# Patient Record
Sex: FEMALE | Race: WHITE | NOT HISPANIC OR LATINO | Employment: OTHER | URBAN - METROPOLITAN AREA
[De-identification: names, ages, dates, MRNs, and addresses within clinical notes are randomized per-mention and may not be internally consistent; named-entity substitution may affect disease eponyms.]

---

## 2017-01-01 ENCOUNTER — APPOINTMENT (OUTPATIENT)
Dept: CT IMAGING | Facility: HOSPITAL | Age: 72
End: 2017-01-01

## 2017-01-01 ENCOUNTER — ANESTHESIA (OUTPATIENT)
Dept: GASTROENTEROLOGY | Facility: HOSPITAL | Age: 72
End: 2017-01-01

## 2017-01-01 ENCOUNTER — APPOINTMENT (OUTPATIENT)
Dept: GENERAL RADIOLOGY | Facility: HOSPITAL | Age: 72
End: 2017-01-01

## 2017-01-01 ENCOUNTER — APPOINTMENT (OUTPATIENT)
Dept: CT IMAGING | Facility: HOSPITAL | Age: 72
End: 2017-01-01
Attending: INTERNAL MEDICINE

## 2017-01-01 ENCOUNTER — ANESTHESIA EVENT (OUTPATIENT)
Dept: GASTROENTEROLOGY | Facility: HOSPITAL | Age: 72
End: 2017-01-01

## 2017-01-01 ENCOUNTER — HOSPITAL ENCOUNTER (INPATIENT)
Facility: HOSPITAL | Age: 72
LOS: 5 days | Discharge: HOME HEALTH | End: 2017-04-08
Attending: EMERGENCY MEDICINE | Admitting: INTERNAL MEDICINE

## 2017-01-01 ENCOUNTER — HOSPITAL ENCOUNTER (INPATIENT)
Facility: HOSPITAL | Age: 72
LOS: 6 days | End: 2017-11-28
Attending: EMERGENCY MEDICINE | Admitting: INTERNAL MEDICINE

## 2017-01-01 VITALS
OXYGEN SATURATION: 95 % | BODY MASS INDEX: 47.8 KG/M2 | DIASTOLIC BLOOD PRESSURE: 71 MMHG | WEIGHT: 280 LBS | SYSTOLIC BLOOD PRESSURE: 152 MMHG | HEART RATE: 79 BPM | HEIGHT: 64 IN | RESPIRATION RATE: 18 BRPM | TEMPERATURE: 98.1 F

## 2017-01-01 VITALS
TEMPERATURE: 96.4 F | WEIGHT: 293 LBS | BODY MASS INDEX: 57.52 KG/M2 | DIASTOLIC BLOOD PRESSURE: 37 MMHG | HEIGHT: 60 IN | SYSTOLIC BLOOD PRESSURE: 71 MMHG

## 2017-01-01 DIAGNOSIS — R53.1 GENERALIZED WEAKNESS: ICD-10-CM

## 2017-01-01 DIAGNOSIS — E66.01 MORBID OBESITY (HCC): ICD-10-CM

## 2017-01-01 DIAGNOSIS — J98.11 ATELECTASIS: ICD-10-CM

## 2017-01-01 DIAGNOSIS — N17.9 ACUTE RENAL FAILURE, UNSPECIFIED ACUTE RENAL FAILURE TYPE (HCC): ICD-10-CM

## 2017-01-01 DIAGNOSIS — J90 PLEURAL EFFUSION, LEFT: Primary | ICD-10-CM

## 2017-01-01 DIAGNOSIS — J18.9 PNEUMONIA OF LEFT LOWER LOBE DUE TO INFECTIOUS ORGANISM: ICD-10-CM

## 2017-01-01 DIAGNOSIS — L03.311 ABDOMINAL WALL CELLULITIS: Primary | ICD-10-CM

## 2017-01-01 LAB
ALBUMIN SERPL-MCNC: 1.4 G/DL (ref 3.5–5.2)
ALBUMIN SERPL-MCNC: 1.5 G/DL (ref 3.5–5.2)
ALBUMIN SERPL-MCNC: 1.5 G/DL (ref 3.5–5.2)
ALBUMIN SERPL-MCNC: 1.8 G/DL (ref 3.5–5.2)
ALBUMIN SERPL-MCNC: 2.6 G/DL (ref 3.5–5.2)
ALBUMIN/GLOB SERPL: 0.4 G/DL
ALBUMIN/GLOB SERPL: 0.5 G/DL
ALBUMIN/GLOB SERPL: 0.8 G/DL
ALP SERPL-CCNC: 103 U/L (ref 39–117)
ALP SERPL-CCNC: 175 U/L (ref 39–117)
ALP SERPL-CCNC: 200 U/L (ref 39–117)
ALP SERPL-CCNC: 209 U/L (ref 39–117)
ALT SERPL W P-5'-P-CCNC: 12 U/L (ref 1–33)
ALT SERPL W P-5'-P-CCNC: 12 U/L (ref 1–33)
ALT SERPL W P-5'-P-CCNC: 15 U/L (ref 1–33)
ALT SERPL W P-5'-P-CCNC: 7 U/L (ref 1–33)
AMMONIA BLD-SCNC: 27 UMOL/L (ref 11–51)
ANION GAP SERPL CALCULATED.3IONS-SCNC: 10 MMOL/L
ANION GAP SERPL CALCULATED.3IONS-SCNC: 10.4 MMOL/L
ANION GAP SERPL CALCULATED.3IONS-SCNC: 13.5 MMOL/L
ANION GAP SERPL CALCULATED.3IONS-SCNC: 14.1 MMOL/L
ANION GAP SERPL CALCULATED.3IONS-SCNC: 14.7 MMOL/L
ANION GAP SERPL CALCULATED.3IONS-SCNC: 16.9 MMOL/L
ANION GAP SERPL CALCULATED.3IONS-SCNC: 18.2 MMOL/L
ANION GAP SERPL CALCULATED.3IONS-SCNC: 9.2 MMOL/L
APPEARANCE FLD: ABNORMAL
APTT PPP: 32.7 SECONDS (ref 22.7–35.4)
ARTERIAL PATENCY WRIST A: ABNORMAL
ARTERIAL PATENCY WRIST A: POSITIVE
ARTERIAL PATENCY WRIST A: POSITIVE
AST SERPL-CCNC: 26 U/L (ref 1–32)
AST SERPL-CCNC: 28 U/L (ref 1–32)
AST SERPL-CCNC: 51 U/L (ref 1–32)
AST SERPL-CCNC: 56 U/L (ref 1–32)
ATMOSPHERIC PRESS: 746.3 MMHG
ATMOSPHERIC PRESS: 752.5 MMHG
ATMOSPHERIC PRESS: 755.4 MMHG
B PERT DNA SPEC QL NAA+PROBE: NOT DETECTED
B-OH-BUTYR SERPL-SCNC: 0.92 MMOL/L (ref 0.02–0.27)
BACTERIA SPEC AEROBE CULT: NORMAL
BACTERIA SPEC AEROBE CULT: NORMAL
BASE EXCESS BLDA CALC-SCNC: -11 MMOL/L (ref 0–2)
BASE EXCESS BLDA CALC-SCNC: -12.4 MMOL/L (ref 0–2)
BASE EXCESS BLDA CALC-SCNC: -9.2 MMOL/L (ref 0–2)
BASOPHILS # BLD AUTO: 0.01 10*3/MM3 (ref 0–0.2)
BASOPHILS # BLD AUTO: 0.01 10*3/MM3 (ref 0–0.2)
BASOPHILS # BLD AUTO: 0.02 10*3/MM3 (ref 0–0.2)
BASOPHILS # BLD AUTO: 0.02 10*3/MM3 (ref 0–0.2)
BASOPHILS # BLD AUTO: 0.05 10*3/MM3 (ref 0–0.2)
BASOPHILS # BLD AUTO: 0.07 10*3/MM3 (ref 0–0.2)
BASOPHILS NFR BLD AUTO: 0.1 % (ref 0–1.5)
BASOPHILS NFR BLD AUTO: 0.3 % (ref 0–1.5)
BASOPHILS NFR BLD AUTO: 0.4 % (ref 0–1.5)
BASOPHILS NFR BLD AUTO: 0.5 % (ref 0–1.5)
BDY SITE: ABNORMAL
BILIRUB CONJ SERPL-MCNC: 0.3 MG/DL (ref 0–0.3)
BILIRUB INDIRECT SERPL-MCNC: 0.3 MG/DL
BILIRUB SERPL-MCNC: 0.2 MG/DL (ref 0.1–1.2)
BILIRUB SERPL-MCNC: 0.2 MG/DL (ref 0.1–1.2)
BILIRUB SERPL-MCNC: 0.5 MG/DL (ref 0.1–1.2)
BILIRUB SERPL-MCNC: 0.6 MG/DL (ref 0.1–1.2)
BILIRUB UR QL STRIP: NEGATIVE
BILIRUB UR QL STRIP: NEGATIVE
BUN BLD-MCNC: 19 MG/DL (ref 8–23)
BUN BLD-MCNC: 21 MG/DL (ref 8–23)
BUN BLD-MCNC: 41 MG/DL (ref 8–23)
BUN BLD-MCNC: 42 MG/DL (ref 8–23)
BUN BLD-MCNC: 48 MG/DL (ref 8–23)
BUN BLD-MCNC: 56 MG/DL (ref 8–23)
BUN BLD-MCNC: 60 MG/DL (ref 8–23)
BUN BLD-MCNC: 65 MG/DL (ref 8–23)
BUN/CREAT SERPL: 17.5 (ref 7–25)
BUN/CREAT SERPL: 18.6 (ref 7–25)
BUN/CREAT SERPL: 20.6 (ref 7–25)
BUN/CREAT SERPL: 21.1 (ref 7–25)
BUN/CREAT SERPL: 21.1 (ref 7–25)
BUN/CREAT SERPL: 23.2 (ref 7–25)
BUN/CREAT SERPL: 23.4 (ref 7–25)
BUN/CREAT SERPL: 23.7 (ref 7–25)
C PNEUM DNA NPH QL NAA+NON-PROBE: NOT DETECTED
CALCIUM SPEC-SCNC: 7.6 MG/DL (ref 8.6–10.5)
CALCIUM SPEC-SCNC: 7.7 MG/DL (ref 8.6–10.5)
CALCIUM SPEC-SCNC: 7.8 MG/DL (ref 8.6–10.5)
CALCIUM SPEC-SCNC: 8 MG/DL (ref 8.6–10.5)
CALCIUM SPEC-SCNC: 8.1 MG/DL (ref 8.6–10.5)
CALCIUM SPEC-SCNC: 8.4 MG/DL (ref 8.6–10.5)
CALCIUM SPEC-SCNC: 8.4 MG/DL (ref 8.6–10.5)
CALCIUM SPEC-SCNC: 8.5 MG/DL (ref 8.6–10.5)
CHLORIDE SERPL-SCNC: 103 MMOL/L (ref 98–107)
CHLORIDE SERPL-SCNC: 104 MMOL/L (ref 98–107)
CHLORIDE SERPL-SCNC: 105 MMOL/L (ref 98–107)
CHLORIDE SERPL-SCNC: 105 MMOL/L (ref 98–107)
CHLORIDE SERPL-SCNC: 106 MMOL/L (ref 98–107)
CHLORIDE SERPL-SCNC: 107 MMOL/L (ref 98–107)
CK SERPL-CCNC: 10 U/L (ref 20–180)
CLARITY UR: ABNORMAL
CLARITY UR: ABNORMAL
CO2 SERPL-SCNC: 11.8 MMOL/L (ref 22–29)
CO2 SERPL-SCNC: 13.1 MMOL/L (ref 22–29)
CO2 SERPL-SCNC: 16.3 MMOL/L (ref 22–29)
CO2 SERPL-SCNC: 16.5 MMOL/L (ref 22–29)
CO2 SERPL-SCNC: 19.9 MMOL/L (ref 22–29)
CO2 SERPL-SCNC: 20.8 MMOL/L (ref 22–29)
CO2 SERPL-SCNC: 26 MMOL/L (ref 22–29)
CO2 SERPL-SCNC: 29.6 MMOL/L (ref 22–29)
COLOR FLD: ABNORMAL
COLOR UR: ABNORMAL
COLOR UR: ABNORMAL
CREAT BLD-MCNC: 1.02 MG/DL (ref 0.57–1)
CREAT BLD-MCNC: 1.2 MG/DL (ref 0.57–1)
CREAT BLD-MCNC: 1.99 MG/DL (ref 0.57–1)
CREAT BLD-MCNC: 1.99 MG/DL (ref 0.57–1)
CREAT BLD-MCNC: 2.27 MG/DL (ref 0.57–1)
CREAT BLD-MCNC: 2.36 MG/DL (ref 0.57–1)
CREAT BLD-MCNC: 2.59 MG/DL (ref 0.57–1)
CREAT BLD-MCNC: 2.78 MG/DL (ref 0.57–1)
CREAT UR-MCNC: 115.3 MG/DL
CYTO UR: NORMAL
D-LACTATE SERPL-SCNC: 0.9 MMOL/L (ref 0.5–2)
D-LACTATE SERPL-SCNC: 1.9 MMOL/L (ref 0.5–2)
D-LACTATE SERPL-SCNC: 2.2 MMOL/L (ref 0.5–2)
DEPRECATED RDW RBC AUTO: 48.9 FL (ref 37–54)
DEPRECATED RDW RBC AUTO: 55.9 FL (ref 37–54)
DEPRECATED RDW RBC AUTO: 56.6 FL (ref 37–54)
DEPRECATED RDW RBC AUTO: 57.4 FL (ref 37–54)
DEPRECATED RDW RBC AUTO: 57.6 FL (ref 37–54)
DEPRECATED RDW RBC AUTO: 60.3 FL (ref 37–54)
DEPRECATED RDW RBC AUTO: 61.4 FL (ref 37–54)
EOSINOPHIL # BLD AUTO: 0.01 10*3/MM3 (ref 0–0.7)
EOSINOPHIL # BLD AUTO: 0.01 10*3/MM3 (ref 0–0.7)
EOSINOPHIL # BLD AUTO: 0.03 10*3/MM3 (ref 0–0.7)
EOSINOPHIL # BLD AUTO: 0.06 10*3/MM3 (ref 0–0.7)
EOSINOPHIL # BLD AUTO: 0.07 10*3/MM3 (ref 0–0.7)
EOSINOPHIL # BLD AUTO: 0.33 10*3/MM3 (ref 0–0.7)
EOSINOPHIL NFR BLD AUTO: 0 % (ref 0.3–6.2)
EOSINOPHIL NFR BLD AUTO: 0.1 % (ref 0.3–6.2)
EOSINOPHIL NFR BLD AUTO: 0.1 % (ref 0.3–6.2)
EOSINOPHIL NFR BLD AUTO: 0.5 % (ref 0.3–6.2)
EOSINOPHIL NFR BLD AUTO: 0.6 % (ref 0.3–6.2)
EOSINOPHIL NFR BLD AUTO: 5.9 % (ref 0.3–6.2)
EOSINOPHIL NFR FLD MANUAL: 3 %
ERYTHROCYTE [DISTWIDTH] IN BLOOD BY AUTOMATED COUNT: 13 % (ref 11.7–13)
ERYTHROCYTE [DISTWIDTH] IN BLOOD BY AUTOMATED COUNT: 15.3 % (ref 11.7–13)
ERYTHROCYTE [DISTWIDTH] IN BLOOD BY AUTOMATED COUNT: 15.5 % (ref 11.7–13)
ERYTHROCYTE [DISTWIDTH] IN BLOOD BY AUTOMATED COUNT: 15.8 % (ref 11.7–13)
ERYTHROCYTE [DISTWIDTH] IN BLOOD BY AUTOMATED COUNT: 15.9 % (ref 11.7–13)
ERYTHROCYTE [DISTWIDTH] IN BLOOD BY AUTOMATED COUNT: 16.3 % (ref 11.7–13)
ERYTHROCYTE [DISTWIDTH] IN BLOOD BY AUTOMATED COUNT: 16.6 % (ref 11.7–13)
FLUAV H1 2009 PAND RNA NPH QL NAA+PROBE: NOT DETECTED
FLUAV H1 HA GENE NPH QL NAA+PROBE: NOT DETECTED
FLUAV H3 RNA NPH QL NAA+PROBE: NOT DETECTED
FLUAV SUBTYP SPEC NAA+PROBE: NOT DETECTED
FLUBV RNA ISLT QL NAA+PROBE: NOT DETECTED
FUNGUS WND CULT: NORMAL
GAS FLOW AIRWAY: 2 LPM
GAS FLOW AIRWAY: 3 LPM
GAS FLOW AIRWAY: 3 LPM
GFR SERPL CREATININE-BSD FRML MDRD: 17 ML/MIN/1.73
GFR SERPL CREATININE-BSD FRML MDRD: 18 ML/MIN/1.73
GFR SERPL CREATININE-BSD FRML MDRD: 20 ML/MIN/1.73
GFR SERPL CREATININE-BSD FRML MDRD: 21 ML/MIN/1.73
GFR SERPL CREATININE-BSD FRML MDRD: 25 ML/MIN/1.73
GFR SERPL CREATININE-BSD FRML MDRD: 25 ML/MIN/1.73
GFR SERPL CREATININE-BSD FRML MDRD: 44 ML/MIN/1.73
GFR SERPL CREATININE-BSD FRML MDRD: 53 ML/MIN/1.73
GIE STN SPEC: NORMAL
GLOBULIN UR ELPH-MCNC: 3.2 GM/DL
GLOBULIN UR ELPH-MCNC: 3.4 GM/DL
GLOBULIN UR ELPH-MCNC: 3.9 GM/DL
GLUCOSE BLD-MCNC: 103 MG/DL (ref 65–99)
GLUCOSE BLD-MCNC: 136 MG/DL (ref 65–99)
GLUCOSE BLD-MCNC: 66 MG/DL (ref 65–99)
GLUCOSE BLD-MCNC: 69 MG/DL (ref 65–99)
GLUCOSE BLD-MCNC: 78 MG/DL (ref 65–99)
GLUCOSE BLD-MCNC: 86 MG/DL (ref 65–99)
GLUCOSE BLD-MCNC: 91 MG/DL (ref 65–99)
GLUCOSE BLD-MCNC: 94 MG/DL (ref 65–99)
GLUCOSE BLDC GLUCOMTR-MCNC: 117 MG/DL (ref 70–130)
GLUCOSE BLDC GLUCOMTR-MCNC: 95 MG/DL (ref 70–130)
GLUCOSE UR STRIP-MCNC: NEGATIVE MG/DL
GLUCOSE UR STRIP-MCNC: NEGATIVE MG/DL
GRAM STN SPEC: NORMAL
HADV DNA SPEC NAA+PROBE: NOT DETECTED
HCO3 BLDA-SCNC: 13.8 MMOL/L (ref 22–28)
HCO3 BLDA-SCNC: 15.6 MMOL/L (ref 22–28)
HCO3 BLDA-SCNC: 15.7 MMOL/L (ref 22–28)
HCOV 229E RNA SPEC QL NAA+PROBE: NOT DETECTED
HCOV HKU1 RNA SPEC QL NAA+PROBE: NOT DETECTED
HCOV NL63 RNA SPEC QL NAA+PROBE: NOT DETECTED
HCOV OC43 RNA SPEC QL NAA+PROBE: NOT DETECTED
HCT VFR BLD AUTO: 30.5 % (ref 35.6–45.5)
HCT VFR BLD AUTO: 31.1 % (ref 35.6–45.5)
HCT VFR BLD AUTO: 31.9 % (ref 35.6–45.5)
HCT VFR BLD AUTO: 32.4 % (ref 35.6–45.5)
HCT VFR BLD AUTO: 34.4 % (ref 35.6–45.5)
HCT VFR BLD AUTO: 35.5 % (ref 35.6–45.5)
HCT VFR BLD AUTO: 36.6 % (ref 35.6–45.5)
HGB BLD-MCNC: 10.1 G/DL (ref 11.9–15.5)
HGB BLD-MCNC: 10.5 G/DL (ref 11.9–15.5)
HGB BLD-MCNC: 10.8 G/DL (ref 11.9–15.5)
HGB BLD-MCNC: 11.5 G/DL (ref 11.9–15.5)
HGB BLD-MCNC: 9.6 G/DL (ref 11.9–15.5)
HGB BLD-MCNC: 9.7 G/DL (ref 11.9–15.5)
HGB BLD-MCNC: 9.7 G/DL (ref 11.9–15.5)
HGB UR QL STRIP.AUTO: NEGATIVE
HGB UR QL STRIP.AUTO: NEGATIVE
HMPV RNA NPH QL NAA+NON-PROBE: NOT DETECTED
HOLD SPECIMEN: NORMAL
HPIV1 RNA SPEC QL NAA+PROBE: NOT DETECTED
HPIV2 RNA SPEC QL NAA+PROBE: NOT DETECTED
HPIV3 RNA NPH QL NAA+PROBE: NOT DETECTED
HPIV4 P GENE NPH QL NAA+PROBE: NOT DETECTED
IMM GRANULOCYTES # BLD: 0 10*3/MM3 (ref 0–0.03)
IMM GRANULOCYTES # BLD: 0.05 10*3/MM3 (ref 0–0.03)
IMM GRANULOCYTES # BLD: 0.06 10*3/MM3 (ref 0–0.03)
IMM GRANULOCYTES # BLD: 0.11 10*3/MM3 (ref 0–0.03)
IMM GRANULOCYTES # BLD: 0.22 10*3/MM3 (ref 0–0.03)
IMM GRANULOCYTES # BLD: 0.23 10*3/MM3 (ref 0–0.03)
IMM GRANULOCYTES NFR BLD: 0 % (ref 0–0.5)
IMM GRANULOCYTES NFR BLD: 0.4 % (ref 0–0.5)
IMM GRANULOCYTES NFR BLD: 0.4 % (ref 0–0.5)
IMM GRANULOCYTES NFR BLD: 0.9 % (ref 0–0.5)
IMM GRANULOCYTES NFR BLD: 1 % (ref 0–0.5)
IMM GRANULOCYTES NFR BLD: 1 % (ref 0–0.5)
INR PPP: 1.11 (ref 0.9–1.1)
KETONES UR QL STRIP: NEGATIVE
KETONES UR QL STRIP: NEGATIVE
LAB AP CASE REPORT: NORMAL
LAB AP CLINICAL INFORMATION: NORMAL
LEUKOCYTE ESTERASE UR QL STRIP.AUTO: NEGATIVE
LEUKOCYTE ESTERASE UR QL STRIP.AUTO: NEGATIVE
LIPASE SERPL-CCNC: 20 U/L (ref 13–60)
LYMPHOCYTES # BLD AUTO: 0.32 10*3/MM3 (ref 0.9–4.8)
LYMPHOCYTES # BLD AUTO: 0.74 10*3/MM3 (ref 0.9–4.8)
LYMPHOCYTES # BLD AUTO: 1.3 10*3/MM3 (ref 0.9–4.8)
LYMPHOCYTES # BLD AUTO: 1.31 10*3/MM3 (ref 0.9–4.8)
LYMPHOCYTES # BLD AUTO: 1.54 10*3/MM3 (ref 0.9–4.8)
LYMPHOCYTES # BLD AUTO: 1.69 10*3/MM3 (ref 0.9–4.8)
LYMPHOCYTES NFR BLD AUTO: 1.4 % (ref 19.6–45.3)
LYMPHOCYTES NFR BLD AUTO: 15.9 % (ref 19.6–45.3)
LYMPHOCYTES NFR BLD AUTO: 27.3 % (ref 19.6–45.3)
LYMPHOCYTES NFR BLD AUTO: 3 % (ref 19.6–45.3)
LYMPHOCYTES NFR BLD AUTO: 8.3 % (ref 19.6–45.3)
LYMPHOCYTES NFR BLD AUTO: 9.9 % (ref 19.6–45.3)
LYMPHOCYTES NFR FLD MANUAL: 8 %
Lab: NORMAL
M PNEUMO IGG SER IA-ACNC: NOT DETECTED
MAGNESIUM SERPL-MCNC: 1.9 MG/DL (ref 1.6–2.4)
MAGNESIUM SERPL-MCNC: 2 MG/DL (ref 1.6–2.4)
MCH RBC QN AUTO: 30.8 PG (ref 26.9–32)
MCH RBC QN AUTO: 31.2 PG (ref 26.9–32)
MCH RBC QN AUTO: 31.2 PG (ref 26.9–32)
MCH RBC QN AUTO: 31.3 PG (ref 26.9–32)
MCH RBC QN AUTO: 31.6 PG (ref 26.9–32)
MCH RBC QN AUTO: 31.9 PG (ref 26.9–32)
MCH RBC QN AUTO: 32.3 PG (ref 26.9–32)
MCHC RBC AUTO-ENTMCNC: 30.1 G/DL (ref 32.4–36.3)
MCHC RBC AUTO-ENTMCNC: 30.4 G/DL (ref 32.4–36.3)
MCHC RBC AUTO-ENTMCNC: 30.5 G/DL (ref 32.4–36.3)
MCHC RBC AUTO-ENTMCNC: 31.2 G/DL (ref 32.4–36.3)
MCHC RBC AUTO-ENTMCNC: 31.2 G/DL (ref 32.4–36.3)
MCHC RBC AUTO-ENTMCNC: 31.4 G/DL (ref 32.4–36.3)
MCHC RBC AUTO-ENTMCNC: 31.8 G/DL (ref 32.4–36.3)
MCV RBC AUTO: 100 FL (ref 80.5–98.2)
MCV RBC AUTO: 100.9 FL (ref 80.5–98.2)
MCV RBC AUTO: 102.2 FL (ref 80.5–98.2)
MCV RBC AUTO: 102.8 FL (ref 80.5–98.2)
MCV RBC AUTO: 102.9 FL (ref 80.5–98.2)
MCV RBC AUTO: 103.6 FL (ref 80.5–98.2)
MCV RBC AUTO: 99.3 FL (ref 80.5–98.2)
METHOD: ABNORMAL
MODALITY: ABNORMAL
MONOCYTES # BLD AUTO: 0.16 10*3/MM3 (ref 0.2–1.2)
MONOCYTES # BLD AUTO: 0.18 10*3/MM3 (ref 0.2–1.2)
MONOCYTES # BLD AUTO: 0.39 10*3/MM3 (ref 0.2–1.2)
MONOCYTES # BLD AUTO: 0.67 10*3/MM3 (ref 0.2–1.2)
MONOCYTES # BLD AUTO: 0.84 10*3/MM3 (ref 0.2–1.2)
MONOCYTES # BLD AUTO: 0.84 10*3/MM3 (ref 0.2–1.2)
MONOCYTES NFR BLD AUTO: 0.7 % (ref 5–12)
MONOCYTES NFR BLD AUTO: 0.7 % (ref 5–12)
MONOCYTES NFR BLD AUTO: 4.3 % (ref 5–12)
MONOCYTES NFR BLD AUTO: 6.4 % (ref 5–12)
MONOCYTES NFR BLD AUTO: 6.9 % (ref 5–12)
MONOCYTES NFR BLD AUTO: 7.9 % (ref 5–12)
MONOCYTES NFR FLD: 4 %
MONOS+MACROS NFR FLD: 11 %
MYCOBACTERIUM SPEC CULT: NORMAL
NEUTROPHILS # BLD AUTO: 10.85 10*3/MM3 (ref 1.9–8.1)
NEUTROPHILS # BLD AUTO: 13.7 10*3/MM3 (ref 1.9–8.1)
NEUTROPHILS # BLD AUTO: 22.01 10*3/MM3 (ref 1.9–8.1)
NEUTROPHILS # BLD AUTO: 23.82 10*3/MM3 (ref 1.9–8.1)
NEUTROPHILS # BLD AUTO: 3.36 10*3/MM3 (ref 1.9–8.1)
NEUTROPHILS # BLD AUTO: 7.87 10*3/MM3 (ref 1.9–8.1)
NEUTROPHILS NFR BLD AUTO: 59.5 % (ref 42.7–76)
NEUTROPHILS NFR BLD AUTO: 74.1 % (ref 42.7–76)
NEUTROPHILS NFR BLD AUTO: 82.7 % (ref 42.7–76)
NEUTROPHILS NFR BLD AUTO: 86.8 % (ref 42.7–76)
NEUTROPHILS NFR BLD AUTO: 95.1 % (ref 42.7–76)
NEUTROPHILS NFR BLD AUTO: 96.7 % (ref 42.7–76)
NEUTROPHILS NFR FLD MANUAL: 74 %
NIGHT BLUE STAIN TISS: NORMAL
NITRITE UR QL STRIP: NEGATIVE
NITRITE UR QL STRIP: NEGATIVE
NRBC BLD MANUAL-RTO: 0 /100 WBC (ref 0–0)
NRBC BLD MANUAL-RTO: 0 /100 WBC (ref 0–0)
NRBC BLD MANUAL-RTO: 0.1 /100 WBC (ref 0–0)
NT-PROBNP SERPL-MCNC: 913.1 PG/ML (ref 5–900)
NUC CELL # FLD: 780 /MM3
PATH REPORT.FINAL DX SPEC: NORMAL
PATH REPORT.GROSS SPEC: NORMAL
PCO2 BLDA: 29.6 MM HG (ref 35–45)
PCO2 BLDA: 31.7 MM HG (ref 35–45)
PCO2 BLDA: 36.7 MM HG (ref 35–45)
PH BLDA: 7.24 PH UNITS (ref 7.35–7.45)
PH BLDA: 7.25 PH UNITS (ref 7.35–7.45)
PH BLDA: 7.33 PH UNITS (ref 7.35–7.45)
PH UR STRIP.AUTO: 5.5 [PH] (ref 5–8)
PH UR STRIP.AUTO: 5.5 [PH] (ref 5–8)
PHOSPHATE SERPL-MCNC: 4.2 MG/DL (ref 2.5–4.5)
PHOSPHATE SERPL-MCNC: 4.6 MG/DL (ref 2.5–4.5)
PHOSPHATE SERPL-MCNC: 4.8 MG/DL (ref 2.5–4.5)
PHOSPHATE SERPL-MCNC: 5.7 MG/DL (ref 2.5–4.5)
PLAT MORPH BLD: NORMAL
PLATELET # BLD AUTO: 162 10*3/MM3 (ref 140–500)
PLATELET # BLD AUTO: 215 10*3/MM3 (ref 140–500)
PLATELET # BLD AUTO: 222 10*3/MM3 (ref 140–500)
PLATELET # BLD AUTO: 230 10*3/MM3 (ref 140–500)
PLATELET # BLD AUTO: 235 10*3/MM3 (ref 140–500)
PLATELET # BLD AUTO: 245 10*3/MM3 (ref 140–500)
PLATELET # BLD AUTO: 300 10*3/MM3 (ref 140–500)
PMV BLD AUTO: 10.1 FL (ref 6–12)
PMV BLD AUTO: 10.3 FL (ref 6–12)
PMV BLD AUTO: 10.3 FL (ref 6–12)
PMV BLD AUTO: 9.5 FL (ref 6–12)
PMV BLD AUTO: 9.5 FL (ref 6–12)
PMV BLD AUTO: 9.7 FL (ref 6–12)
PMV BLD AUTO: 9.9 FL (ref 6–12)
PO2 BLDA: 108.6 MM HG (ref 80–100)
PO2 BLDA: 68.1 MM HG (ref 80–100)
PO2 BLDA: 69.5 MM HG (ref 80–100)
POTASSIUM BLD-SCNC: 3.7 MMOL/L (ref 3.5–5.2)
POTASSIUM BLD-SCNC: 3.9 MMOL/L (ref 3.5–5.2)
POTASSIUM BLD-SCNC: 3.9 MMOL/L (ref 3.5–5.2)
POTASSIUM BLD-SCNC: 4.2 MMOL/L (ref 3.5–5.2)
POTASSIUM BLD-SCNC: 4.3 MMOL/L (ref 3.5–5.2)
POTASSIUM BLD-SCNC: 4.3 MMOL/L (ref 3.5–5.2)
POTASSIUM BLD-SCNC: 4.5 MMOL/L (ref 3.5–5.2)
POTASSIUM BLD-SCNC: 4.8 MMOL/L (ref 3.5–5.2)
PROCALCITONIN SERPL-MCNC: 0.58 NG/ML (ref 0.1–0.25)
PROCALCITONIN SERPL-MCNC: 11.16 NG/ML (ref 0.1–0.25)
PROCALCITONIN SERPL-MCNC: 13.26 NG/ML (ref 0.1–0.25)
PROCALCITONIN SERPL-MCNC: 16.65 NG/ML (ref 0.1–0.25)
PROCALCITONIN SERPL-MCNC: 16.88 NG/ML (ref 0.1–0.25)
PROT SERPL-MCNC: 4.9 G/DL (ref 6–8.5)
PROT SERPL-MCNC: 5.1 G/DL (ref 6–8.5)
PROT SERPL-MCNC: 5.7 G/DL (ref 6–8.5)
PROT SERPL-MCNC: 5.8 G/DL (ref 6–8.5)
PROT UR QL STRIP: ABNORMAL
PROT UR QL STRIP: ABNORMAL
PROTHROMBIN TIME: 13.9 SECONDS (ref 11.7–14.2)
RBC # BLD AUTO: 3.07 10*6/MM3 (ref 3.9–5.2)
RBC # BLD AUTO: 3.08 10*6/MM3 (ref 3.9–5.2)
RBC # BLD AUTO: 3.11 10*6/MM3 (ref 3.9–5.2)
RBC # BLD AUTO: 3.17 10*6/MM3 (ref 3.9–5.2)
RBC # BLD AUTO: 3.41 10*6/MM3 (ref 3.9–5.2)
RBC # BLD AUTO: 3.45 10*6/MM3 (ref 3.9–5.2)
RBC # BLD AUTO: 3.56 10*6/MM3 (ref 3.9–5.2)
RBC # FLD AUTO: ABNORMAL /MM3
RBC MORPH BLD: NORMAL
RHINOVIRUS RNA SPEC NAA+PROBE: NOT DETECTED
RSV RNA NPH QL NAA+NON-PROBE: NOT DETECTED
SAO2 % BLDCOA: 89.9 % (ref 92–99)
SAO2 % BLDCOA: 90.8 % (ref 92–99)
SAO2 % BLDCOA: 98 % (ref 92–99)
SET MECH RESP RATE: 16
SODIUM BLD-SCNC: 135 MMOL/L (ref 136–145)
SODIUM BLD-SCNC: 136 MMOL/L (ref 136–145)
SODIUM BLD-SCNC: 137 MMOL/L (ref 136–145)
SODIUM BLD-SCNC: 137 MMOL/L (ref 136–145)
SODIUM BLD-SCNC: 142 MMOL/L (ref 136–145)
SODIUM BLD-SCNC: 146 MMOL/L (ref 136–145)
SODIUM UR-SCNC: <20 MMOL/L
SP GR UR STRIP: 1.02 (ref 1–1.03)
SP GR UR STRIP: 1.02 (ref 1–1.03)
TOTAL RATE: 24 BREATHS/MINUTE
TOTAL RATE: 26 BREATHS/MINUTE
TROPONIN T SERPL-MCNC: <0.01 NG/ML (ref 0–0.03)
URATE SERPL-MCNC: 12.3 MG/DL (ref 2.4–5.7)
UROBILINOGEN UR QL STRIP: ABNORMAL
UROBILINOGEN UR QL STRIP: ABNORMAL
VANCOMYCIN SERPL-MCNC: 26.4 MCG/ML (ref 5–40)
VANCOMYCIN SERPL-MCNC: 30.6 MCG/ML (ref 5–40)
VANCOMYCIN SERPL-MCNC: 37.7 MCG/ML (ref 5–40)
WBC MORPH BLD: NORMAL
WBC NRBC COR # BLD: 10.62 10*3/MM3 (ref 4.5–10.7)
WBC NRBC COR # BLD: 12.18 10*3/MM3 (ref 4.5–10.7)
WBC NRBC COR # BLD: 13.12 10*3/MM3 (ref 4.5–10.7)
WBC NRBC COR # BLD: 15.76 10*3/MM3 (ref 4.5–10.7)
WBC NRBC COR # BLD: 22.77 10*3/MM3 (ref 4.5–10.7)
WBC NRBC COR # BLD: 25.04 10*3/MM3 (ref 4.5–10.7)
WBC NRBC COR # BLD: 5.64 10*3/MM3 (ref 4.5–10.7)
WHOLE BLOOD HOLD SPECIMEN: NORMAL

## 2017-01-01 PROCEDURE — 99284 EMERGENCY DEPT VISIT MOD MDM: CPT

## 2017-01-01 PROCEDURE — 84484 ASSAY OF TROPONIN QUANT: CPT | Performed by: HOSPITALIST

## 2017-01-01 PROCEDURE — 74176 CT ABD & PELVIS W/O CONTRAST: CPT

## 2017-01-01 PROCEDURE — 25010000002 HYDROMORPHONE PER 4 MG

## 2017-01-01 PROCEDURE — 81003 URINALYSIS AUTO W/O SCOPE: CPT | Performed by: HOSPITALIST

## 2017-01-01 PROCEDURE — 85025 COMPLETE CBC W/AUTO DIFF WBC: CPT | Performed by: EMERGENCY MEDICINE

## 2017-01-01 PROCEDURE — 83735 ASSAY OF MAGNESIUM: CPT | Performed by: HOSPITALIST

## 2017-01-01 PROCEDURE — 25010000002 HEPARIN (PORCINE) PER 1000 UNITS: Performed by: INTERNAL MEDICINE

## 2017-01-01 PROCEDURE — 94799 UNLISTED PULMONARY SVC/PX: CPT

## 2017-01-01 PROCEDURE — 85025 COMPLETE CBC W/AUTO DIFF WBC: CPT | Performed by: HOSPITALIST

## 2017-01-01 PROCEDURE — 25010000002 PIPERACILLIN SOD-TAZOBACTAM PER 1 G: Performed by: INTERNAL MEDICINE

## 2017-01-01 PROCEDURE — 82962 GLUCOSE BLOOD TEST: CPT

## 2017-01-01 PROCEDURE — 25010000002 FLUCONAZOLE PER 200 MG: Performed by: INTERNAL MEDICINE

## 2017-01-01 PROCEDURE — 80202 ASSAY OF VANCOMYCIN: CPT | Performed by: HOSPITALIST

## 2017-01-01 PROCEDURE — 99285 EMERGENCY DEPT VISIT HI MDM: CPT

## 2017-01-01 PROCEDURE — 83735 ASSAY OF MAGNESIUM: CPT | Performed by: EMERGENCY MEDICINE

## 2017-01-01 PROCEDURE — 93005 ELECTROCARDIOGRAM TRACING: CPT | Performed by: INTERNAL MEDICINE

## 2017-01-01 PROCEDURE — 87206 SMEAR FLUORESCENT/ACID STAI: CPT | Performed by: INTERNAL MEDICINE

## 2017-01-01 PROCEDURE — 80048 BASIC METABOLIC PNL TOTAL CA: CPT | Performed by: HOSPITALIST

## 2017-01-01 PROCEDURE — 84145 PROCALCITONIN (PCT): CPT | Performed by: INTERNAL MEDICINE

## 2017-01-01 PROCEDURE — 25010000002 VANCOMYCIN 10 G RECONSTITUTED SOLUTION: Performed by: HOSPITALIST

## 2017-01-01 PROCEDURE — 88312 SPECIAL STAINS GROUP 1: CPT | Performed by: INTERNAL MEDICINE

## 2017-01-01 PROCEDURE — 36600 WITHDRAWAL OF ARTERIAL BLOOD: CPT

## 2017-01-01 PROCEDURE — 94640 AIRWAY INHALATION TREATMENT: CPT

## 2017-01-01 PROCEDURE — 85027 COMPLETE CBC AUTOMATED: CPT | Performed by: HOSPITALIST

## 2017-01-01 PROCEDURE — 87633 RESP VIRUS 12-25 TARGETS: CPT | Performed by: HOSPITALIST

## 2017-01-01 PROCEDURE — 25010000002 PROPOFOL 10 MG/ML EMULSION: Performed by: ANESTHESIOLOGY

## 2017-01-01 PROCEDURE — 82803 BLOOD GASES ANY COMBINATION: CPT

## 2017-01-01 PROCEDURE — 80053 COMPREHEN METABOLIC PANEL: CPT | Performed by: EMERGENCY MEDICINE

## 2017-01-01 PROCEDURE — 93010 ELECTROCARDIOGRAM REPORT: CPT | Performed by: INTERNAL MEDICINE

## 2017-01-01 PROCEDURE — 25010000002 HEPARIN (PORCINE) PER 1000 UNITS: Performed by: HOSPITALIST

## 2017-01-01 PROCEDURE — 83735 ASSAY OF MAGNESIUM: CPT | Performed by: INTERNAL MEDICINE

## 2017-01-01 PROCEDURE — 93005 ELECTROCARDIOGRAM TRACING: CPT | Performed by: HOSPITALIST

## 2017-01-01 PROCEDURE — 99222 1ST HOSP IP/OBS MODERATE 55: CPT | Performed by: INTERNAL MEDICINE

## 2017-01-01 PROCEDURE — 83605 ASSAY OF LACTIC ACID: CPT | Performed by: HOSPITALIST

## 2017-01-01 PROCEDURE — 81003 URINALYSIS AUTO W/O SCOPE: CPT | Performed by: EMERGENCY MEDICINE

## 2017-01-01 PROCEDURE — 87040 BLOOD CULTURE FOR BACTERIA: CPT | Performed by: EMERGENCY MEDICINE

## 2017-01-01 PROCEDURE — 82010 KETONE BODYS QUAN: CPT | Performed by: HOSPITALIST

## 2017-01-01 PROCEDURE — 85007 BL SMEAR W/DIFF WBC COUNT: CPT | Performed by: EMERGENCY MEDICINE

## 2017-01-01 PROCEDURE — 97110 THERAPEUTIC EXERCISES: CPT

## 2017-01-01 PROCEDURE — 83690 ASSAY OF LIPASE: CPT | Performed by: EMERGENCY MEDICINE

## 2017-01-01 PROCEDURE — 63710000001 PROCHLORPERAZINE MALEATE PER 5 MG: Performed by: INTERNAL MEDICINE

## 2017-01-01 PROCEDURE — 25010000002 EPINEPHRINE PER 0.1 MG: Performed by: INTERNAL MEDICINE

## 2017-01-01 PROCEDURE — 25010000003 CEFTRIAXONE PER 250 MG: Performed by: INTERNAL MEDICINE

## 2017-01-01 PROCEDURE — 25010000002 VANCOMYCIN: Performed by: EMERGENCY MEDICINE

## 2017-01-01 PROCEDURE — 85610 PROTHROMBIN TIME: CPT | Performed by: INTERNAL MEDICINE

## 2017-01-01 PROCEDURE — 93005 ELECTROCARDIOGRAM TRACING: CPT | Performed by: EMERGENCY MEDICINE

## 2017-01-01 PROCEDURE — 25010000002 HYDROMORPHONE PER 4 MG: Performed by: INTERNAL MEDICINE

## 2017-01-01 PROCEDURE — 82570 ASSAY OF URINE CREATININE: CPT | Performed by: INTERNAL MEDICINE

## 2017-01-01 PROCEDURE — 0B9B8ZX DRAINAGE OF LEFT LOWER LOBE BRONCHUS, VIA NATURAL OR ARTIFICIAL OPENING ENDOSCOPIC, DIAGNOSTIC: ICD-10-PCS | Performed by: INTERNAL MEDICINE

## 2017-01-01 PROCEDURE — 84484 ASSAY OF TROPONIN QUANT: CPT | Performed by: EMERGENCY MEDICINE

## 2017-01-01 PROCEDURE — 83880 ASSAY OF NATRIURETIC PEPTIDE: CPT | Performed by: EMERGENCY MEDICINE

## 2017-01-01 PROCEDURE — 71250 CT THORAX DX C-: CPT

## 2017-01-01 PROCEDURE — 87102 FUNGUS ISOLATION CULTURE: CPT | Performed by: INTERNAL MEDICINE

## 2017-01-01 PROCEDURE — 25010000002 LEVOFLOXACIN PER 250 MG: Performed by: HOSPITALIST

## 2017-01-01 PROCEDURE — 80069 RENAL FUNCTION PANEL: CPT | Performed by: INTERNAL MEDICINE

## 2017-01-01 PROCEDURE — 80053 COMPREHEN METABOLIC PANEL: CPT | Performed by: HOSPITALIST

## 2017-01-01 PROCEDURE — 71020 HC CHEST PA AND LATERAL: CPT

## 2017-01-01 PROCEDURE — 85730 THROMBOPLASTIN TIME PARTIAL: CPT | Performed by: INTERNAL MEDICINE

## 2017-01-01 PROCEDURE — 84550 ASSAY OF BLOOD/URIC ACID: CPT | Performed by: INTERNAL MEDICINE

## 2017-01-01 PROCEDURE — 82140 ASSAY OF AMMONIA: CPT | Performed by: HOSPITALIST

## 2017-01-01 PROCEDURE — 87581 M.PNEUMON DNA AMP PROBE: CPT | Performed by: HOSPITALIST

## 2017-01-01 PROCEDURE — 84145 PROCALCITONIN (PCT): CPT | Performed by: EMERGENCY MEDICINE

## 2017-01-01 PROCEDURE — 25010000002 ENOXAPARIN PER 10 MG: Performed by: HOSPITALIST

## 2017-01-01 PROCEDURE — 87798 DETECT AGENT NOS DNA AMP: CPT | Performed by: HOSPITALIST

## 2017-01-01 PROCEDURE — 87486 CHLMYD PNEUM DNA AMP PROBE: CPT | Performed by: HOSPITALIST

## 2017-01-01 PROCEDURE — 80076 HEPATIC FUNCTION PANEL: CPT | Performed by: HOSPITALIST

## 2017-01-01 PROCEDURE — 71010 HC CHEST PA OR AP: CPT

## 2017-01-01 PROCEDURE — 84100 ASSAY OF PHOSPHORUS: CPT | Performed by: HOSPITALIST

## 2017-01-01 PROCEDURE — 87116 MYCOBACTERIA CULTURE: CPT | Performed by: INTERNAL MEDICINE

## 2017-01-01 PROCEDURE — 36415 COLL VENOUS BLD VENIPUNCTURE: CPT | Performed by: EMERGENCY MEDICINE

## 2017-01-01 PROCEDURE — 88305 TISSUE EXAM BY PATHOLOGIST: CPT | Performed by: INTERNAL MEDICINE

## 2017-01-01 PROCEDURE — 25010000002 LEVETIRACETAM IN NACL 0.82% 500 MG/100ML SOLUTION: Performed by: HOSPITALIST

## 2017-01-01 PROCEDURE — 89051 BODY FLUID CELL COUNT: CPT | Performed by: INTERNAL MEDICINE

## 2017-01-01 PROCEDURE — 88112 CYTOPATH CELL ENHANCE TECH: CPT | Performed by: INTERNAL MEDICINE

## 2017-01-01 PROCEDURE — 84100 ASSAY OF PHOSPHORUS: CPT | Performed by: INTERNAL MEDICINE

## 2017-01-01 PROCEDURE — 87071 CULTURE AEROBIC QUANT OTHER: CPT | Performed by: INTERNAL MEDICINE

## 2017-01-01 PROCEDURE — 82550 ASSAY OF CK (CPK): CPT | Performed by: HOSPITALIST

## 2017-01-01 PROCEDURE — 83605 ASSAY OF LACTIC ACID: CPT | Performed by: EMERGENCY MEDICINE

## 2017-01-01 PROCEDURE — 87205 SMEAR GRAM STAIN: CPT | Performed by: INTERNAL MEDICINE

## 2017-01-01 PROCEDURE — 25010000002 PIPERACILLIN SOD-TAZOBACTAM PER 1 G: Performed by: EMERGENCY MEDICINE

## 2017-01-01 PROCEDURE — 97162 PT EVAL MOD COMPLEX 30 MIN: CPT

## 2017-01-01 PROCEDURE — 80048 BASIC METABOLIC PNL TOTAL CA: CPT | Performed by: INTERNAL MEDICINE

## 2017-01-01 PROCEDURE — 25010000002 MORPHINE PER 10 MG: Performed by: HOSPITALIST

## 2017-01-01 PROCEDURE — 84300 ASSAY OF URINE SODIUM: CPT | Performed by: INTERNAL MEDICINE

## 2017-01-01 RX ORDER — DIPHENOXYLATE HYDROCHLORIDE AND ATROPINE SULFATE 2.5; .025 MG/1; MG/1
1 TABLET ORAL
Status: DISCONTINUED | OUTPATIENT
Start: 2017-01-01 | End: 2017-11-29 | Stop reason: HOSPADM

## 2017-01-01 RX ORDER — IPRATROPIUM BROMIDE AND ALBUTEROL SULFATE 2.5; .5 MG/3ML; MG/3ML
3 SOLUTION RESPIRATORY (INHALATION)
Status: DISCONTINUED | OUTPATIENT
Start: 2017-01-01 | End: 2017-01-01

## 2017-01-01 RX ORDER — HYDROMORPHONE HCL 110MG/55ML
2 PATIENT CONTROLLED ANALGESIA SYRINGE INTRAVENOUS
Status: DISCONTINUED | OUTPATIENT
Start: 2017-01-01 | End: 2017-11-29 | Stop reason: HOSPADM

## 2017-01-01 RX ORDER — PROCHLORPERAZINE MALEATE 10 MG
10 TABLET ORAL EVERY 6 HOURS PRN
COMMUNITY

## 2017-01-01 RX ORDER — AMLODIPINE BESYLATE 5 MG/1
5 TABLET ORAL DAILY
Status: DISCONTINUED | OUTPATIENT
Start: 2017-01-01 | End: 2017-01-01

## 2017-01-01 RX ORDER — NYSTATIN 100000 [USP'U]/G
POWDER TOPICAL EVERY 12 HOURS SCHEDULED
Status: DISCONTINUED | OUTPATIENT
Start: 2017-01-01 | End: 2017-01-01

## 2017-01-01 RX ORDER — HEPARIN SODIUM 5000 [USP'U]/ML
5000 INJECTION, SOLUTION INTRAVENOUS; SUBCUTANEOUS EVERY 8 HOURS SCHEDULED
Status: DISCONTINUED | OUTPATIENT
Start: 2017-01-01 | End: 2017-01-01 | Stop reason: HOSPADM

## 2017-01-01 RX ORDER — HYDROCODONE BITARTRATE AND ACETAMINOPHEN 5; 325 MG/1; MG/1
2 TABLET ORAL 3 TIMES DAILY PRN
Status: DISCONTINUED | OUTPATIENT
Start: 2017-01-01 | End: 2017-01-01

## 2017-01-01 RX ORDER — LORAZEPAM 1 MG/1
2 TABLET ORAL
Status: DISCONTINUED | OUTPATIENT
Start: 2017-01-01 | End: 2017-11-29 | Stop reason: HOSPADM

## 2017-01-01 RX ORDER — FLUTICASONE PROPIONATE 50 MCG
1 SPRAY, SUSPENSION (ML) NASAL DAILY
Status: DISCONTINUED | OUTPATIENT
Start: 2017-01-01 | End: 2017-01-01 | Stop reason: HOSPADM

## 2017-01-01 RX ORDER — LORAZEPAM 2 MG/ML
2 CONCENTRATE ORAL
Status: DISCONTINUED | OUTPATIENT
Start: 2017-01-01 | End: 2017-11-29 | Stop reason: HOSPADM

## 2017-01-01 RX ORDER — SACCHAROMYCES BOULARDII 250 MG
250 CAPSULE ORAL 2 TIMES DAILY
COMMUNITY
End: 2017-01-01 | Stop reason: CLARIF

## 2017-01-01 RX ORDER — SODIUM CHLORIDE 0.9 % (FLUSH) 0.9 %
1-10 SYRINGE (ML) INJECTION AS NEEDED
Status: DISCONTINUED | OUTPATIENT
Start: 2017-01-01 | End: 2017-01-01 | Stop reason: HOSPADM

## 2017-01-01 RX ORDER — METRONIDAZOLE 500 MG/1
500 TABLET ORAL 3 TIMES DAILY
COMMUNITY

## 2017-01-01 RX ORDER — LEVOTHYROXINE SODIUM 0.07 MG/1
75 TABLET ORAL
Status: DISCONTINUED | OUTPATIENT
Start: 2017-01-01 | End: 2017-01-01

## 2017-01-01 RX ORDER — POTASSIUM CHLORIDE 750 MG/1
20 CAPSULE, EXTENDED RELEASE ORAL DAILY
Status: DISCONTINUED | OUTPATIENT
Start: 2017-01-01 | End: 2017-01-01 | Stop reason: HOSPADM

## 2017-01-01 RX ORDER — LORAZEPAM 1 MG/1
1 TABLET ORAL
Status: DISCONTINUED | OUTPATIENT
Start: 2017-01-01 | End: 2017-11-29 | Stop reason: HOSPADM

## 2017-01-01 RX ORDER — LEVETIRACETAM 500 MG/1
TABLET ORAL
Qty: 28 TABLET | Refills: 0 | Status: SHIPPED | OUTPATIENT
Start: 2017-01-01 | End: 2017-01-01 | Stop reason: SDUPTHER

## 2017-01-01 RX ORDER — LEVOFLOXACIN 5 MG/ML
750 INJECTION, SOLUTION INTRAVENOUS
Status: COMPLETED | OUTPATIENT
Start: 2017-01-01 | End: 2017-01-01

## 2017-01-01 RX ORDER — LORATADINE 10 MG/1
TABLET ORAL
Qty: 30 TABLET | Refills: 0 | Status: SHIPPED | OUTPATIENT
Start: 2017-01-01 | End: 2017-01-01 | Stop reason: SDUPTHER

## 2017-01-01 RX ORDER — LORAZEPAM 2 MG/ML
1 INJECTION INTRAMUSCULAR
Status: DISCONTINUED | OUTPATIENT
Start: 2017-01-01 | End: 2017-11-29 | Stop reason: HOSPADM

## 2017-01-01 RX ORDER — ACETAMINOPHEN 160 MG/5ML
650 SOLUTION ORAL EVERY 4 HOURS PRN
Status: DISCONTINUED | OUTPATIENT
Start: 2017-01-01 | End: 2017-11-29 | Stop reason: HOSPADM

## 2017-01-01 RX ORDER — CYCLOBENZAPRINE HCL 10 MG
10 TABLET ORAL EVERY 12 HOURS PRN
Status: DISCONTINUED | OUTPATIENT
Start: 2017-01-01 | End: 2017-01-01 | Stop reason: HOSPADM

## 2017-01-01 RX ORDER — IPRATROPIUM BROMIDE AND ALBUTEROL SULFATE 2.5; .5 MG/3ML; MG/3ML
3 SOLUTION RESPIRATORY (INHALATION)
Status: DISCONTINUED | OUTPATIENT
Start: 2017-01-01 | End: 2017-01-01 | Stop reason: HOSPADM

## 2017-01-01 RX ORDER — HALOPERIDOL 1 MG/1
1 TABLET ORAL EVERY 4 HOURS PRN
Status: DISCONTINUED | OUTPATIENT
Start: 2017-01-01 | End: 2017-11-29 | Stop reason: HOSPADM

## 2017-01-01 RX ORDER — FUROSEMIDE 40 MG/1
40 TABLET ORAL DAILY
Status: DISCONTINUED | OUTPATIENT
Start: 2017-01-01 | End: 2017-01-01 | Stop reason: HOSPADM

## 2017-01-01 RX ORDER — MONTELUKAST SODIUM 10 MG/1
TABLET ORAL
Qty: 30 TABLET | Refills: 2 | OUTPATIENT
Start: 2017-01-01

## 2017-01-01 RX ORDER — FLUCONAZOLE 2 MG/ML
100 INJECTION, SOLUTION INTRAVENOUS DAILY
Status: DISCONTINUED | OUTPATIENT
Start: 2017-01-01 | End: 2017-01-01

## 2017-01-01 RX ORDER — PANTOPRAZOLE SODIUM 20 MG/1
20 TABLET, DELAYED RELEASE ORAL
Status: DISCONTINUED | OUTPATIENT
Start: 2017-01-01 | End: 2017-01-01 | Stop reason: HOSPADM

## 2017-01-01 RX ORDER — ASPIRIN 81 MG/1
81 TABLET, CHEWABLE ORAL DAILY
Status: DISCONTINUED | OUTPATIENT
Start: 2017-01-01 | End: 2017-01-01

## 2017-01-01 RX ORDER — SODIUM CHLORIDE 0.9 % (FLUSH) 0.9 %
10 SYRINGE (ML) INJECTION AS NEEDED
Status: DISCONTINUED | OUTPATIENT
Start: 2017-01-01 | End: 2017-01-01

## 2017-01-01 RX ORDER — LORATADINE 10 MG/1
10 TABLET ORAL DAILY
COMMUNITY

## 2017-01-01 RX ORDER — DORZOLAMIDE HYDROCHLORIDE AND TIMOLOL MALEATE 20; 5 MG/ML; MG/ML
1 SOLUTION/ DROPS OPHTHALMIC 2 TIMES DAILY
Status: DISCONTINUED | OUTPATIENT
Start: 2017-01-01 | End: 2017-01-01 | Stop reason: HOSPADM

## 2017-01-01 RX ORDER — SODIUM CHLORIDE 9 MG/ML
125 INJECTION, SOLUTION INTRAVENOUS CONTINUOUS
Status: DISCONTINUED | OUTPATIENT
Start: 2017-01-01 | End: 2017-01-01

## 2017-01-01 RX ORDER — SODIUM CHLORIDE FOR INHALATION 7 %
4 VIAL, NEBULIZER (ML) INHALATION
Status: DISCONTINUED | OUTPATIENT
Start: 2017-01-01 | End: 2017-01-01 | Stop reason: HOSPADM

## 2017-01-01 RX ORDER — MONTELUKAST SODIUM 10 MG/1
10 TABLET ORAL DAILY
COMMUNITY

## 2017-01-01 RX ORDER — HYDROMORPHONE HCL 110MG/55ML
PATIENT CONTROLLED ANALGESIA SYRINGE INTRAVENOUS
Status: COMPLETED
Start: 2017-01-01 | End: 2017-01-01

## 2017-01-01 RX ORDER — BUDESONIDE 0.5 MG/2ML
0.5 INHALANT ORAL
Status: DISCONTINUED | OUTPATIENT
Start: 2017-01-01 | End: 2017-01-01 | Stop reason: HOSPADM

## 2017-01-01 RX ORDER — LIDOCAINE HYDROCHLORIDE 20 MG/ML
INJECTION, SOLUTION EPIDURAL; INFILTRATION; INTRACAUDAL; PERINEURAL AS NEEDED
Status: DISCONTINUED | OUTPATIENT
Start: 2017-01-01 | End: 2017-01-01 | Stop reason: HOSPADM

## 2017-01-01 RX ORDER — ATORVASTATIN CALCIUM 10 MG/1
10 TABLET, FILM COATED ORAL NIGHTLY
Status: DISCONTINUED | OUTPATIENT
Start: 2017-01-01 | End: 2017-01-01 | Stop reason: HOSPADM

## 2017-01-01 RX ORDER — SODIUM CHLORIDE 450 MG/100ML
100 INJECTION, SOLUTION INTRAVENOUS CONTINUOUS
Status: DISCONTINUED | OUTPATIENT
Start: 2017-01-01 | End: 2017-01-01

## 2017-01-01 RX ORDER — FLUTICASONE PROPIONATE 50 MCG
SPRAY, SUSPENSION (ML) NASAL
Refills: 2 | OUTPATIENT
Start: 2017-01-01

## 2017-01-01 RX ORDER — LEVOTHYROXINE SODIUM 0.07 MG/1
75 TABLET ORAL
Status: DISCONTINUED | OUTPATIENT
Start: 2017-01-01 | End: 2017-01-01 | Stop reason: HOSPADM

## 2017-01-01 RX ORDER — LETROZOLE 2.5 MG/1
2.5 TABLET, FILM COATED ORAL DAILY
COMMUNITY

## 2017-01-01 RX ORDER — ERGOCALCIFEROL 1.25 MG/1
CAPSULE ORAL
Qty: 12 CAPSULE | Refills: 2 | OUTPATIENT
Start: 2017-01-01

## 2017-01-01 RX ORDER — LEVOFLOXACIN 5 MG/ML
500 INJECTION, SOLUTION INTRAVENOUS
Status: DISCONTINUED | OUTPATIENT
Start: 2017-01-01 | End: 2017-01-01 | Stop reason: DRUGHIGH

## 2017-01-01 RX ORDER — SODIUM CHLORIDE FOR INHALATION 7 %
4 VIAL, NEBULIZER (ML) INHALATION
Qty: 240 ML | Refills: 0 | Status: SHIPPED | OUTPATIENT
Start: 2017-01-01 | End: 2017-01-01

## 2017-01-01 RX ORDER — DORZOLAMIDE HYDROCHLORIDE AND TIMOLOL MALEATE 20; 5 MG/ML; MG/ML
1 SOLUTION/ DROPS OPHTHALMIC 2 TIMES DAILY
Status: DISCONTINUED | OUTPATIENT
Start: 2017-01-01 | End: 2017-01-01

## 2017-01-01 RX ORDER — EMOLLIENT COMBINATION NO.110
LOTION (ML) TOPICAL DAILY
Status: DISCONTINUED | OUTPATIENT
Start: 2017-01-01 | End: 2017-01-01 | Stop reason: HOSPADM

## 2017-01-01 RX ORDER — ACETAMINOPHEN 325 MG/1
650 TABLET ORAL EVERY 4 HOURS PRN
Status: DISCONTINUED | OUTPATIENT
Start: 2017-01-01 | End: 2017-11-29 | Stop reason: HOSPADM

## 2017-01-01 RX ORDER — FLUTICASONE PROPIONATE 50 MCG
2 SPRAY, SUSPENSION (ML) NASAL DAILY
Status: DISCONTINUED | OUTPATIENT
Start: 2017-01-01 | End: 2017-01-01

## 2017-01-01 RX ORDER — LISINOPRIL 5 MG/1
5 TABLET ORAL DAILY
COMMUNITY

## 2017-01-01 RX ORDER — HYDROCODONE BITARTRATE AND ACETAMINOPHEN 5; 325 MG/1; MG/1
1 TABLET ORAL EVERY 6 HOURS PRN
Status: DISCONTINUED | OUTPATIENT
Start: 2017-01-01 | End: 2017-01-01 | Stop reason: HOSPADM

## 2017-01-01 RX ORDER — BUMETANIDE 0.25 MG/ML
2 INJECTION INTRAMUSCULAR; INTRAVENOUS ONCE
Status: COMPLETED | OUTPATIENT
Start: 2017-01-01 | End: 2017-01-01

## 2017-01-01 RX ORDER — LEVETIRACETAM 5 MG/ML
500 INJECTION INTRAVASCULAR EVERY 12 HOURS SCHEDULED
Status: DISCONTINUED | OUTPATIENT
Start: 2017-01-01 | End: 2017-01-01

## 2017-01-01 RX ORDER — PANTOPRAZOLE SODIUM 40 MG/1
40 TABLET, DELAYED RELEASE ORAL
Status: DISCONTINUED | OUTPATIENT
Start: 2017-01-01 | End: 2017-01-01

## 2017-01-01 RX ORDER — POTASSIUM CHLORIDE 1500 MG/1
TABLET, EXTENDED RELEASE ORAL
Qty: 60 TABLET | Refills: 1 | Status: SHIPPED | OUTPATIENT
Start: 2017-01-01 | End: 2017-01-01 | Stop reason: SDUPTHER

## 2017-01-01 RX ORDER — LIDOCAINE HYDROCHLORIDE 20 MG/ML
INJECTION, SOLUTION INFILTRATION; PERINEURAL AS NEEDED
Status: DISCONTINUED | OUTPATIENT
Start: 2017-01-01 | End: 2017-01-01 | Stop reason: SURG

## 2017-01-01 RX ORDER — LORAZEPAM 2 MG/ML
1 CONCENTRATE ORAL
Status: DISCONTINUED | OUTPATIENT
Start: 2017-01-01 | End: 2017-11-29 | Stop reason: HOSPADM

## 2017-01-01 RX ORDER — POTASSIUM CHLORIDE 1500 MG/1
TABLET, FILM COATED, EXTENDED RELEASE ORAL
Qty: 60 TABLET | Refills: 0 | OUTPATIENT
Start: 2017-01-01

## 2017-01-01 RX ORDER — LORAZEPAM 2 MG/ML
0.5 INJECTION INTRAMUSCULAR
Status: DISCONTINUED | OUTPATIENT
Start: 2017-01-01 | End: 2017-11-29 | Stop reason: HOSPADM

## 2017-01-01 RX ORDER — MONTELUKAST SODIUM 10 MG/1
10 TABLET ORAL DAILY
Status: DISCONTINUED | OUTPATIENT
Start: 2017-01-01 | End: 2017-01-01 | Stop reason: HOSPADM

## 2017-01-01 RX ORDER — LATANOPROST 50 UG/ML
1 SOLUTION/ DROPS OPHTHALMIC NIGHTLY
Status: DISCONTINUED | OUTPATIENT
Start: 2017-01-01 | End: 2017-01-01

## 2017-01-01 RX ORDER — LEVETIRACETAM 500 MG/1
500 TABLET ORAL 2 TIMES DAILY
Status: DISCONTINUED | OUTPATIENT
Start: 2017-01-01 | End: 2017-01-01

## 2017-01-01 RX ORDER — HALOPERIDOL 2 MG/ML
1 SOLUTION ORAL EVERY 4 HOURS PRN
Status: DISCONTINUED | OUTPATIENT
Start: 2017-01-01 | End: 2017-11-29 | Stop reason: HOSPADM

## 2017-01-01 RX ORDER — PNV NO.95/FERROUS FUM/FOLIC AC 28MG-0.8MG
TABLET ORAL
Qty: 30 TABLET | Refills: 0 | OUTPATIENT
Start: 2017-01-01

## 2017-01-01 RX ORDER — CARVEDILOL 3.12 MG/1
3.12 TABLET ORAL 2 TIMES DAILY
Status: DISCONTINUED | OUTPATIENT
Start: 2017-01-01 | End: 2017-01-01 | Stop reason: HOSPADM

## 2017-01-01 RX ORDER — PROCHLORPERAZINE MALEATE 5 MG/1
5 TABLET ORAL EVERY 6 HOURS PRN
Status: DISCONTINUED | OUTPATIENT
Start: 2017-01-01 | End: 2017-01-01 | Stop reason: HOSPADM

## 2017-01-01 RX ORDER — MORPHINE SULFATE 2 MG/ML
2 INJECTION, SOLUTION INTRAMUSCULAR; INTRAVENOUS
Status: DISCONTINUED | OUTPATIENT
Start: 2017-01-01 | End: 2017-01-01

## 2017-01-01 RX ORDER — HYDROCODONE BITARTRATE AND ACETAMINOPHEN 5; 325 MG/1; MG/1
1 TABLET ORAL 3 TIMES DAILY PRN
COMMUNITY

## 2017-01-01 RX ORDER — LETROZOLE 2.5 MG/1
TABLET, FILM COATED ORAL
Qty: 30 TABLET | Refills: 2 | Status: SHIPPED | OUTPATIENT
Start: 2017-01-01 | End: 2017-01-01 | Stop reason: SDUPTHER

## 2017-01-01 RX ORDER — BUDESONIDE AND FORMOTEROL FUMARATE DIHYDRATE 160; 4.5 UG/1; UG/1
2 AEROSOL RESPIRATORY (INHALATION) DAILY
Status: DISCONTINUED | OUTPATIENT
Start: 2017-01-01 | End: 2017-01-01

## 2017-01-01 RX ORDER — HEPARIN SODIUM 5000 [USP'U]/ML
5000 INJECTION, SOLUTION INTRAVENOUS; SUBCUTANEOUS EVERY 8 HOURS SCHEDULED
Status: DISCONTINUED | OUTPATIENT
Start: 2017-01-01 | End: 2017-01-01

## 2017-01-01 RX ORDER — CEFDINIR 300 MG/1
300 CAPSULE ORAL EVERY 12 HOURS SCHEDULED
Qty: 3 CAPSULE | Refills: 0 | Status: SHIPPED | OUTPATIENT
Start: 2017-01-01 | End: 2017-01-01

## 2017-01-01 RX ORDER — LETROZOLE 2.5 MG/1
2.5 TABLET, FILM COATED ORAL DAILY
Status: DISCONTINUED | OUTPATIENT
Start: 2017-01-01 | End: 2017-01-01

## 2017-01-01 RX ORDER — PNV NO.95/FERROUS FUM/FOLIC AC 28MG-0.8MG
TABLET ORAL
Qty: 30 TABLET | Refills: 0 | Status: SHIPPED | OUTPATIENT
Start: 2017-01-01 | End: 2017-01-01 | Stop reason: SDUPTHER

## 2017-01-01 RX ORDER — SODIUM CHLORIDE 0.9 % (FLUSH) 0.9 %
10 SYRINGE (ML) INJECTION AS NEEDED
Status: DISCONTINUED | OUTPATIENT
Start: 2017-01-01 | End: 2017-01-01 | Stop reason: HOSPADM

## 2017-01-01 RX ORDER — SODIUM CHLORIDE 9 MG/ML
75 INJECTION, SOLUTION INTRAVENOUS CONTINUOUS
Status: DISCONTINUED | OUTPATIENT
Start: 2017-01-01 | End: 2017-01-01

## 2017-01-01 RX ORDER — LIDOCAINE HYDROCHLORIDE 10 MG/ML
INJECTION, SOLUTION EPIDURAL; INFILTRATION; INTRACAUDAL; PERINEURAL AS NEEDED
Status: DISCONTINUED | OUTPATIENT
Start: 2017-01-01 | End: 2017-01-01 | Stop reason: HOSPADM

## 2017-01-01 RX ORDER — LORAZEPAM 2 MG/ML
0.5 CONCENTRATE ORAL
Status: DISCONTINUED | OUTPATIENT
Start: 2017-01-01 | End: 2017-11-29 | Stop reason: HOSPADM

## 2017-01-01 RX ORDER — PRENATAL VIT/IRON FUM/FOLIC AC 27MG-0.8MG
1 TABLET ORAL DAILY
COMMUNITY

## 2017-01-01 RX ORDER — ACETAMINOPHEN 650 MG/1
650 SUPPOSITORY RECTAL EVERY 4 HOURS PRN
Status: DISCONTINUED | OUTPATIENT
Start: 2017-01-01 | End: 2017-11-29 | Stop reason: HOSPADM

## 2017-01-01 RX ORDER — LETROZOLE 2.5 MG/1
TABLET, FILM COATED ORAL
Qty: 30 TABLET | Refills: 1 | OUTPATIENT
Start: 2017-01-01

## 2017-01-01 RX ORDER — SODIUM CHLORIDE FOR INHALATION 7 %
4 VIAL, NEBULIZER (ML) INHALATION EVERY 8 HOURS
Status: DISCONTINUED | OUTPATIENT
Start: 2017-01-01 | End: 2017-01-01

## 2017-01-01 RX ORDER — POTASSIUM CHLORIDE 20 MEQ/1
40 TABLET, EXTENDED RELEASE ORAL DAILY
COMMUNITY

## 2017-01-01 RX ORDER — ECHINACEA PURPUREA EXTRACT 125 MG
2 TABLET ORAL AS NEEDED
Status: DISCONTINUED | OUTPATIENT
Start: 2017-01-01 | End: 2017-01-01 | Stop reason: HOSPADM

## 2017-01-01 RX ORDER — NYSTATIN 100000 [USP'U]/G
POWDER TOPICAL EVERY 8 HOURS SCHEDULED
Status: DISCONTINUED | OUTPATIENT
Start: 2017-01-01 | End: 2017-01-01 | Stop reason: HOSPADM

## 2017-01-01 RX ORDER — AMLODIPINE BESYLATE 5 MG/1
5 TABLET ORAL
Status: DISCONTINUED | OUTPATIENT
Start: 2017-01-01 | End: 2017-01-01 | Stop reason: HOSPADM

## 2017-01-01 RX ORDER — BUDESONIDE AND FORMOTEROL FUMARATE DIHYDRATE 160; 4.5 UG/1; UG/1
2 AEROSOL RESPIRATORY (INHALATION) DAILY
COMMUNITY

## 2017-01-01 RX ORDER — PROPOFOL 10 MG/ML
VIAL (ML) INTRAVENOUS AS NEEDED
Status: DISCONTINUED | OUTPATIENT
Start: 2017-01-01 | End: 2017-01-01 | Stop reason: SURG

## 2017-01-01 RX ORDER — SODIUM CHLORIDE, SODIUM LACTATE, POTASSIUM CHLORIDE, CALCIUM CHLORIDE 600; 310; 30; 20 MG/100ML; MG/100ML; MG/100ML; MG/100ML
30 INJECTION, SOLUTION INTRAVENOUS CONTINUOUS PRN
Status: DISCONTINUED | OUTPATIENT
Start: 2017-01-01 | End: 2017-01-01 | Stop reason: HOSPADM

## 2017-01-01 RX ORDER — HALOPERIDOL 5 MG/ML
1 INJECTION INTRAMUSCULAR EVERY 4 HOURS PRN
Status: DISCONTINUED | OUTPATIENT
Start: 2017-01-01 | End: 2017-11-29 | Stop reason: HOSPADM

## 2017-01-01 RX ORDER — MONTELUKAST SODIUM 10 MG/1
10 TABLET ORAL DAILY
Status: DISCONTINUED | OUTPATIENT
Start: 2017-01-01 | End: 2017-01-01

## 2017-01-01 RX ORDER — SCOLOPAMINE TRANSDERMAL SYSTEM 1 MG/1
1 PATCH, EXTENDED RELEASE TRANSDERMAL
Status: DISCONTINUED | OUTPATIENT
Start: 2017-01-01 | End: 2017-11-29 | Stop reason: HOSPADM

## 2017-01-01 RX ORDER — NYSTATIN 100000 [USP'U]/G
POWDER TOPICAL
Qty: 15 G | Refills: 0 | Status: SHIPPED | OUTPATIENT
Start: 2017-01-01 | End: 2017-01-01 | Stop reason: ALTCHOICE

## 2017-01-01 RX ORDER — LEVETIRACETAM 500 MG/1
500 TABLET ORAL 2 TIMES DAILY
COMMUNITY

## 2017-01-01 RX ORDER — MORPHINE SULFATE 2 MG/ML
2 INJECTION, SOLUTION INTRAMUSCULAR; INTRAVENOUS ONCE
Status: COMPLETED | OUTPATIENT
Start: 2017-01-01 | End: 2017-01-01

## 2017-01-01 RX ORDER — VITAMIN A ACETATE, .BETA.-CAROTENE, ASCORBIC ACID, CHOLECALCIFEROL, .ALPHA.-TOCOPHEROL ACETATE, DL-, THIAMINE MONONITRATE, RIBOFLAVIN, NIACINAMIDE, PYRIDOXINE HYDROCHLORIDE, FOLIC ACID, CYANOCOBALAMIN, CALCIUM CARBONATE, FERROUS FUMARATE, ZINC OXIDE, AND CUPRIC OXIDE 2000; 2000; 120; 400; 22; 1.84; 3; 20; 10; 1; 12; 200; 27; 25; 2 [IU]/1; [IU]/1; MG/1; [IU]/1; MG/1; MG/1; MG/1; MG/1; MG/1; MG/1; UG/1; MG/1; MG/1; MG/1; MG/1
1 TABLET ORAL DAILY
Qty: 30 TABLET | Refills: 3 | OUTPATIENT
Start: 2017-01-01 | End: 2017-01-01 | Stop reason: ALTCHOICE

## 2017-01-01 RX ORDER — LEVETIRACETAM 500 MG/1
500 TABLET ORAL EVERY 12 HOURS SCHEDULED
Status: DISCONTINUED | OUTPATIENT
Start: 2017-01-01 | End: 2017-01-01 | Stop reason: HOSPADM

## 2017-01-01 RX ORDER — LORAZEPAM 0.5 MG/1
0.5 TABLET ORAL
Status: DISCONTINUED | OUTPATIENT
Start: 2017-01-01 | End: 2017-11-29 | Stop reason: HOSPADM

## 2017-01-01 RX ORDER — HYDROCODONE BITARTRATE AND ACETAMINOPHEN 5; 325 MG/1; MG/1
1 TABLET ORAL 3 TIMES DAILY PRN
Status: DISCONTINUED | OUTPATIENT
Start: 2017-01-01 | End: 2017-01-01

## 2017-01-01 RX ORDER — AMLODIPINE BESYLATE 5 MG/1
5 TABLET ORAL DAILY
Qty: 30 TABLET | Refills: 0 | Status: SHIPPED | OUTPATIENT
Start: 2017-01-01

## 2017-01-01 RX ORDER — CEFDINIR 300 MG/1
300 CAPSULE ORAL EVERY 12 HOURS SCHEDULED
Status: DISCONTINUED | OUTPATIENT
Start: 2017-01-01 | End: 2017-01-01 | Stop reason: HOSPADM

## 2017-01-01 RX ORDER — HYDROCODONE BITARTRATE AND ACETAMINOPHEN 5; 325 MG/1; MG/1
2 TABLET ORAL EVERY 4 HOURS PRN
Status: DISCONTINUED | OUTPATIENT
Start: 2017-01-01 | End: 2017-11-29 | Stop reason: HOSPADM

## 2017-01-01 RX ORDER — IPRATROPIUM BROMIDE AND ALBUTEROL SULFATE 2.5; .5 MG/3ML; MG/3ML
3 SOLUTION RESPIRATORY (INHALATION)
Qty: 360 ML | Refills: 1 | Status: SHIPPED | OUTPATIENT
Start: 2017-01-01

## 2017-01-01 RX ORDER — LETROZOLE 2.5 MG/1
2.5 TABLET, FILM COATED ORAL DAILY
Status: DISCONTINUED | OUTPATIENT
Start: 2017-01-01 | End: 2017-01-01 | Stop reason: HOSPADM

## 2017-01-01 RX ORDER — LORAZEPAM 2 MG/ML
2 INJECTION INTRAMUSCULAR
Status: DISCONTINUED | OUTPATIENT
Start: 2017-01-01 | End: 2017-11-29 | Stop reason: HOSPADM

## 2017-01-01 RX ORDER — CETIRIZINE HYDROCHLORIDE 10 MG/1
5 TABLET ORAL DAILY
Status: DISCONTINUED | OUTPATIENT
Start: 2017-01-01 | End: 2017-01-01

## 2017-01-01 RX ORDER — HYDROMORPHONE HCL 110MG/55ML
1.5 PATIENT CONTROLLED ANALGESIA SYRINGE INTRAVENOUS
Status: DISCONTINUED | OUTPATIENT
Start: 2017-01-01 | End: 2017-11-29 | Stop reason: HOSPADM

## 2017-01-01 RX ORDER — SODIUM CHLORIDE 9 MG/ML
100 INJECTION, SOLUTION INTRAVENOUS CONTINUOUS
Status: DISCONTINUED | OUTPATIENT
Start: 2017-01-01 | End: 2017-01-01

## 2017-01-01 RX ORDER — SWAB
SWAB, NON-MEDICATED MISCELLANEOUS
Qty: 30 EACH | Refills: 1 | Status: SHIPPED | OUTPATIENT
Start: 2017-01-01 | End: 2017-01-01 | Stop reason: HOSPADM

## 2017-01-01 RX ORDER — SODIUM CHLORIDE 0.9 % (FLUSH) 0.9 %
1-10 SYRINGE (ML) INJECTION AS NEEDED
Status: DISCONTINUED | OUTPATIENT
Start: 2017-01-01 | End: 2017-01-01

## 2017-01-01 RX ORDER — FLUTICASONE PROPIONATE 50 MCG
2 SPRAY, SUSPENSION (ML) NASAL DAILY
COMMUNITY

## 2017-01-01 RX ORDER — LEVOTHYROXINE SODIUM 0.07 MG/1
75 TABLET ORAL DAILY
COMMUNITY

## 2017-01-01 RX ADMIN — CARVEDILOL 3.12 MG: 3.12 TABLET, FILM COATED ORAL at 09:15

## 2017-01-01 RX ADMIN — ENOXAPARIN SODIUM 30 MG: 30 INJECTION SUBCUTANEOUS at 19:52

## 2017-01-01 RX ADMIN — Medication 1.5 MG: at 01:47

## 2017-01-01 RX ADMIN — DORZOLAMIDE HYDROCHLORIDE AND TIMOLOL MALEATE 1 DROP: 20; 5 SOLUTION/ DROPS OPHTHALMIC at 17:32

## 2017-01-01 RX ADMIN — LEVETIRACETAM 500 MG: 500 TABLET, FILM COATED ORAL at 17:46

## 2017-01-01 RX ADMIN — LETROZOLE 2.5 MG: 2.5 TABLET ORAL at 08:48

## 2017-01-01 RX ADMIN — HYDROCODONE BITARTATE AND ACETAMINOPHEN 1 TABLET: 5; 325 TABLET ORAL at 14:25

## 2017-01-01 RX ADMIN — HYDROCODONE BITARTRATE AND ACETAMINOPHEN 2 TABLET: 5; 325 TABLET ORAL at 17:46

## 2017-01-01 RX ADMIN — CARVEDILOL 3.12 MG: 3.12 TABLET, FILM COATED ORAL at 09:55

## 2017-01-01 RX ADMIN — LEVETIRACETAM 500 MG: 500 TABLET, FILM COATED ORAL at 09:15

## 2017-01-01 RX ADMIN — NYSTATIN: 100000 POWDER TOPICAL at 22:01

## 2017-01-01 RX ADMIN — MICONAZOLE NITRATE: 2 POWDER TOPICAL at 21:14

## 2017-01-01 RX ADMIN — PANTOPRAZOLE SODIUM 40 MG: 40 TABLET, DELAYED RELEASE ORAL at 06:07

## 2017-01-01 RX ADMIN — MORPHINE SULFATE 2 MG: 2 INJECTION, SOLUTION INTRAMUSCULAR; INTRAVENOUS at 19:00

## 2017-01-01 RX ADMIN — LEVETIRACETAM 500 MG: 500 TABLET, FILM COATED ORAL at 09:55

## 2017-01-01 RX ADMIN — SODIUM CHLORIDE SOLN NEBU 7%: 7 NEBU SOLN at 19:24

## 2017-01-01 RX ADMIN — CETIRIZINE HYDROCHLORIDE 5 MG: 10 TABLET, FILM COATED ORAL at 09:51

## 2017-01-01 RX ADMIN — IPRATROPIUM BROMIDE AND ALBUTEROL SULFATE 3 ML: .5; 3 SOLUTION RESPIRATORY (INHALATION) at 20:05

## 2017-01-01 RX ADMIN — IPRATROPIUM BROMIDE AND ALBUTEROL SULFATE 3 ML: .5; 3 SOLUTION RESPIRATORY (INHALATION) at 07:27

## 2017-01-01 RX ADMIN — LEVOTHYROXINE SODIUM 75 MCG: 75 TABLET ORAL at 07:31

## 2017-01-01 RX ADMIN — DORZOLAMIDE HYDROCHLORIDE AND TIMOLOL MALEATE 1 DROP: 20; 5 SOLUTION/ DROPS OPHTHALMIC at 09:32

## 2017-01-01 RX ADMIN — MORPHINE SULFATE 2 MG: 2 INJECTION, SOLUTION INTRAMUSCULAR; INTRAVENOUS at 16:53

## 2017-01-01 RX ADMIN — MONTELUKAST 10 MG: 10 TABLET, FILM COATED ORAL at 23:32

## 2017-01-01 RX ADMIN — FLUCONAZOLE 100 MG: 2 INJECTION INTRAVENOUS at 15:12

## 2017-01-01 RX ADMIN — NYSTATIN: 100000 POWDER TOPICAL at 06:46

## 2017-01-01 RX ADMIN — MONTELUKAST 10 MG: 10 TABLET, FILM COATED ORAL at 10:00

## 2017-01-01 RX ADMIN — AMLODIPINE BESYLATE 5 MG: 5 TABLET ORAL at 17:32

## 2017-01-01 RX ADMIN — PANTOPRAZOLE SODIUM 20 MG: 20 TABLET, DELAYED RELEASE ORAL at 17:32

## 2017-01-01 RX ADMIN — HYDROCODONE BITARTRATE AND ACETAMINOPHEN 2 TABLET: 5; 325 TABLET ORAL at 03:25

## 2017-01-01 RX ADMIN — IPRATROPIUM BROMIDE AND ALBUTEROL SULFATE 3 ML: .5; 3 SOLUTION RESPIRATORY (INHALATION) at 15:52

## 2017-01-01 RX ADMIN — NYSTATIN 1 APPLICATION: 100000 POWDER TOPICAL at 13:51

## 2017-01-01 RX ADMIN — DORZOLAMIDE HYDROCHLORIDE AND TIMOLOL MALEATE 1 DROP: 20; 5 SOLUTION/ DROPS OPHTHALMIC at 17:46

## 2017-01-01 RX ADMIN — IPRATROPIUM BROMIDE AND ALBUTEROL SULFATE 3 ML: .5; 3 SOLUTION RESPIRATORY (INHALATION) at 19:16

## 2017-01-01 RX ADMIN — POTASSIUM CHLORIDE 20 MEQ: 750 CAPSULE, EXTENDED RELEASE ORAL at 09:15

## 2017-01-01 RX ADMIN — FLUTICASONE PROPIONATE 2 SPRAY: 50 SPRAY, METERED NASAL at 09:51

## 2017-01-01 RX ADMIN — BUDESONIDE 0.5 MG: 0.5 INHALANT RESPIRATORY (INHALATION) at 20:44

## 2017-01-01 RX ADMIN — HEPARIN SODIUM 5000 UNITS: 5000 INJECTION, SOLUTION INTRAVENOUS; SUBCUTANEOUS at 15:32

## 2017-01-01 RX ADMIN — LEVETIRACETAM 500 MG: 500 TABLET, FILM COATED ORAL at 21:16

## 2017-01-01 RX ADMIN — NYSTATIN: 100000 POWDER TOPICAL at 14:29

## 2017-01-01 RX ADMIN — ASPIRIN 81 MG: 81 TABLET, CHEWABLE ORAL at 09:32

## 2017-01-01 RX ADMIN — LEVOTHYROXINE SODIUM 75 MCG: 75 TABLET ORAL at 07:37

## 2017-01-01 RX ADMIN — LETROZOLE 2.5 MG: 2.5 TABLET ORAL at 09:15

## 2017-01-01 RX ADMIN — HEPARIN SODIUM 5000 UNITS: 5000 INJECTION, SOLUTION INTRAVENOUS; SUBCUTANEOUS at 21:16

## 2017-01-01 RX ADMIN — IPRATROPIUM BROMIDE AND ALBUTEROL SULFATE 3 ML: .5; 3 SOLUTION RESPIRATORY (INHALATION) at 20:43

## 2017-01-01 RX ADMIN — HYDROMORPHONE HYDROCHLORIDE 1 MG: 10 INJECTION INTRAMUSCULAR; INTRAVENOUS; SUBCUTANEOUS at 19:07

## 2017-01-01 RX ADMIN — DORZOLAMIDE HYDROCHLORIDE AND TIMOLOL MALEATE 1 DROP: 20; 5 SOLUTION/ DROPS OPHTHALMIC at 09:13

## 2017-01-01 RX ADMIN — NYSTATIN: 100000 POWDER TOPICAL at 21:55

## 2017-01-01 RX ADMIN — HYDROCODONE BITARTRATE AND ACETAMINOPHEN 2 TABLET: 5; 325 TABLET ORAL at 09:33

## 2017-01-01 RX ADMIN — DORZOLAMIDE HYDROCHLORIDE AND TIMOLOL MALEATE 1 DROP: 20; 5 SOLUTION/ DROPS OPHTHALMIC at 08:47

## 2017-01-01 RX ADMIN — LEVOFLOXACIN 500 MG: 5 INJECTION, SOLUTION INTRAVENOUS at 01:52

## 2017-01-01 RX ADMIN — IPRATROPIUM BROMIDE AND ALBUTEROL SULFATE 3 ML: .5; 3 SOLUTION RESPIRATORY (INHALATION) at 07:40

## 2017-01-01 RX ADMIN — IPRATROPIUM BROMIDE AND ALBUTEROL SULFATE 3 ML: .5; 3 SOLUTION RESPIRATORY (INHALATION) at 23:43

## 2017-01-01 RX ADMIN — IPRATROPIUM BROMIDE AND ALBUTEROL SULFATE 3 ML: .5; 3 SOLUTION RESPIRATORY (INHALATION) at 07:49

## 2017-01-01 RX ADMIN — IPRATROPIUM BROMIDE AND ALBUTEROL SULFATE 3 ML: .5; 3 SOLUTION RESPIRATORY (INHALATION) at 19:20

## 2017-01-01 RX ADMIN — HYDROCODONE BITARTRATE AND ACETAMINOPHEN 1 TABLET: 5; 325 TABLET ORAL at 20:03

## 2017-01-01 RX ADMIN — LEVETIRACETAM 500 MG: 500 TABLET, FILM COATED ORAL at 09:32

## 2017-01-01 RX ADMIN — BUDESONIDE 0.5 MG: 0.5 INHALANT RESPIRATORY (INHALATION) at 20:05

## 2017-01-01 RX ADMIN — DORZOLAMIDE HYDROCHLORIDE AND TIMOLOL MALEATE 1 DROP: 20; 5 SOLUTION/ DROPS OPHTHALMIC at 08:49

## 2017-01-01 RX ADMIN — BUDESONIDE AND FORMOTEROL FUMARATE DIHYDRATE 2 PUFF: 160; 4.5 AEROSOL RESPIRATORY (INHALATION) at 07:35

## 2017-01-01 RX ADMIN — ASPIRIN 81 MG: 81 TABLET, CHEWABLE ORAL at 09:51

## 2017-01-01 RX ADMIN — LEVETIRACETAM 500 MG: 500 TABLET, FILM COATED ORAL at 17:22

## 2017-01-01 RX ADMIN — NYSTATIN: 100000 POWDER TOPICAL at 09:32

## 2017-01-01 RX ADMIN — LETROZOLE 2.5 MG: 2.5 TABLET ORAL at 23:34

## 2017-01-01 RX ADMIN — IPRATROPIUM BROMIDE AND ALBUTEROL SULFATE 3 ML: .5; 3 SOLUTION RESPIRATORY (INHALATION) at 12:00

## 2017-01-01 RX ADMIN — PANTOPRAZOLE SODIUM 20 MG: 20 TABLET, DELAYED RELEASE ORAL at 17:58

## 2017-01-01 RX ADMIN — DORZOLAMIDE HYDROCHLORIDE AND TIMOLOL MALEATE 1 DROP: 20; 5 SOLUTION/ DROPS OPHTHALMIC at 18:49

## 2017-01-01 RX ADMIN — FUROSEMIDE 40 MG: 40 TABLET ORAL at 17:18

## 2017-01-01 RX ADMIN — LEVETIRACETAM 500 MG: 500 TABLET, FILM COATED ORAL at 23:33

## 2017-01-01 RX ADMIN — LETROZOLE 2.5 MG: 2.5 TABLET ORAL at 09:32

## 2017-01-01 RX ADMIN — LEVOTHYROXINE SODIUM 75 MCG: 75 TABLET ORAL at 07:03

## 2017-01-01 RX ADMIN — IPRATROPIUM BROMIDE AND ALBUTEROL SULFATE 3 ML: .5; 3 SOLUTION RESPIRATORY (INHALATION) at 07:34

## 2017-01-01 RX ADMIN — NYSTATIN: 100000 POWDER TOPICAL at 19:52

## 2017-01-01 RX ADMIN — HYDROMORPHONE HYDROCHLORIDE 1 MG: 10 INJECTION INTRAMUSCULAR; INTRAVENOUS; SUBCUTANEOUS at 11:06

## 2017-01-01 RX ADMIN — NYSTATIN: 100000 POWDER TOPICAL at 06:55

## 2017-01-01 RX ADMIN — HYDROCODONE BITARTATE AND ACETAMINOPHEN 1 TABLET: 5; 325 TABLET ORAL at 14:27

## 2017-01-01 RX ADMIN — HEPARIN SODIUM 5000 UNITS: 5000 INJECTION, SOLUTION INTRAVENOUS; SUBCUTANEOUS at 14:31

## 2017-01-01 RX ADMIN — SODIUM CHLORIDE 1000 ML: 9 INJECTION, SOLUTION INTRAVENOUS at 16:49

## 2017-01-01 RX ADMIN — LEVETIRACETAM 500 MG: 500 INJECTION, SOLUTION INTRAVENOUS at 21:12

## 2017-01-01 RX ADMIN — SALINE NASAL SPRAY 2 SPRAY: 1.5 SOLUTION NASAL at 16:51

## 2017-01-01 RX ADMIN — HYDROCODONE BITARTRATE AND ACETAMINOPHEN 1 TABLET: 5; 325 TABLET ORAL at 05:06

## 2017-01-01 RX ADMIN — LEVOTHYROXINE SODIUM 75 MCG: 75 TABLET ORAL at 07:20

## 2017-01-01 RX ADMIN — TAZOBACTAM SODIUM AND PIPERACILLIN SODIUM 3.38 G: 375; 3 INJECTION, SOLUTION INTRAVENOUS at 16:30

## 2017-01-01 RX ADMIN — BUDESONIDE AND FORMOTEROL FUMARATE DIHYDRATE 2 PUFF: 160; 4.5 AEROSOL RESPIRATORY (INHALATION) at 19:24

## 2017-01-01 RX ADMIN — IPRATROPIUM BROMIDE AND ALBUTEROL SULFATE 3 ML: .5; 3 SOLUTION RESPIRATORY (INHALATION) at 07:06

## 2017-01-01 RX ADMIN — DORZOLAMIDE HYDROCHLORIDE AND TIMOLOL MALEATE 1 DROP: 20; 5 SOLUTION/ DROPS OPHTHALMIC at 17:20

## 2017-01-01 RX ADMIN — HEPARIN SODIUM 5000 UNITS: 5000 INJECTION, SOLUTION INTRAVENOUS; SUBCUTANEOUS at 22:00

## 2017-01-01 RX ADMIN — SODIUM CHLORIDE, POTASSIUM CHLORIDE, SODIUM LACTATE AND CALCIUM CHLORIDE: 600; 310; 30; 20 INJECTION, SOLUTION INTRAVENOUS at 12:15

## 2017-01-01 RX ADMIN — DORZOLAMIDE HYDROCHLORIDE AND TIMOLOL MALEATE 1 DROP: 20; 5 SOLUTION/ DROPS OPHTHALMIC at 09:51

## 2017-01-01 RX ADMIN — HEPARIN SODIUM 5000 UNITS: 5000 INJECTION, SOLUTION INTRAVENOUS; SUBCUTANEOUS at 14:14

## 2017-01-01 RX ADMIN — NYSTATIN: 100000 POWDER TOPICAL at 22:28

## 2017-01-01 RX ADMIN — SODIUM CHLORIDE 500 ML: 9 INJECTION, SOLUTION INTRAVENOUS at 15:30

## 2017-01-01 RX ADMIN — SODIUM CHLORIDE SOLN NEBU 7% 4 ML: 7 NEBU SOLN at 19:16

## 2017-01-01 RX ADMIN — HEPARIN SODIUM 5000 UNITS: 5000 INJECTION, SOLUTION INTRAVENOUS; SUBCUTANEOUS at 21:15

## 2017-01-01 RX ADMIN — LEVETIRACETAM 500 MG: 500 TABLET, FILM COATED ORAL at 22:00

## 2017-01-01 RX ADMIN — CARVEDILOL 3.12 MG: 3.12 TABLET, FILM COATED ORAL at 09:14

## 2017-01-01 RX ADMIN — IPRATROPIUM BROMIDE AND ALBUTEROL SULFATE 3 ML: .5; 3 SOLUTION RESPIRATORY (INHALATION) at 08:06

## 2017-01-01 RX ADMIN — HYDROCODONE BITARTRATE AND ACETAMINOPHEN 1 TABLET: 5; 325 TABLET ORAL at 21:58

## 2017-01-01 RX ADMIN — HEPARIN SODIUM 5000 UNITS: 5000 INJECTION, SOLUTION INTRAVENOUS; SUBCUTANEOUS at 05:40

## 2017-01-01 RX ADMIN — LATANOPROST 1 DROP: 50 SOLUTION OPHTHALMIC at 21:58

## 2017-01-01 RX ADMIN — CARVEDILOL 3.12 MG: 3.12 TABLET, FILM COATED ORAL at 17:58

## 2017-01-01 RX ADMIN — BUDESONIDE 0.5 MG: 0.5 INHALANT RESPIRATORY (INHALATION) at 07:40

## 2017-01-01 RX ADMIN — PANTOPRAZOLE SODIUM 40 MG: 40 TABLET, DELAYED RELEASE ORAL at 05:40

## 2017-01-01 RX ADMIN — SODIUM CHLORIDE 1000 ML: 9 INJECTION, SOLUTION INTRAVENOUS at 16:42

## 2017-01-01 RX ADMIN — LIDOCAINE HYDROCHLORIDE 50 MG: 20 INJECTION, SOLUTION INFILTRATION; PERINEURAL at 12:15

## 2017-01-01 RX ADMIN — NYSTATIN: 100000 POWDER TOPICAL at 21:12

## 2017-01-01 RX ADMIN — HYDROMORPHONE HYDROCHLORIDE 2 MG: 2 INJECTION INTRAMUSCULAR; INTRAVENOUS; SUBCUTANEOUS at 07:50

## 2017-01-01 RX ADMIN — LEVOTHYROXINE SODIUM 75 MCG: 75 TABLET ORAL at 06:07

## 2017-01-01 RX ADMIN — MONTELUKAST 10 MG: 10 TABLET, FILM COATED ORAL at 09:31

## 2017-01-01 RX ADMIN — LEVETIRACETAM 500 MG: 500 TABLET, FILM COATED ORAL at 08:46

## 2017-01-01 RX ADMIN — IPRATROPIUM BROMIDE AND ALBUTEROL SULFATE 3 ML: .5; 3 SOLUTION RESPIRATORY (INHALATION) at 12:42

## 2017-01-01 RX ADMIN — HYDROCODONE BITARTRATE AND ACETAMINOPHEN 2 TABLET: 5; 325 TABLET ORAL at 22:24

## 2017-01-01 RX ADMIN — DORZOLAMIDE HYDROCHLORIDE AND TIMOLOL MALEATE 1 DROP: 20; 5 SOLUTION/ DROPS OPHTHALMIC at 01:12

## 2017-01-01 RX ADMIN — NYSTATIN: 100000 POWDER TOPICAL at 11:09

## 2017-01-01 RX ADMIN — SODIUM CHLORIDE SOLN NEBU 7% 4 ML: 7 NEBU SOLN at 20:09

## 2017-01-01 RX ADMIN — IPRATROPIUM BROMIDE AND ALBUTEROL SULFATE 3 ML: .5; 3 SOLUTION RESPIRATORY (INHALATION) at 11:20

## 2017-01-01 RX ADMIN — CARVEDILOL 3.12 MG: 3.12 TABLET, FILM COATED ORAL at 17:18

## 2017-01-01 RX ADMIN — CARVEDILOL 3.12 MG: 3.12 TABLET, FILM COATED ORAL at 17:32

## 2017-01-01 RX ADMIN — TAZOBACTAM SODIUM AND PIPERACILLIN SODIUM 3.38 G: 375; 3 INJECTION, SOLUTION INTRAVENOUS at 06:17

## 2017-01-01 RX ADMIN — SODIUM CHLORIDE SOLN NEBU 7% 4 ML: 7 NEBU SOLN at 08:06

## 2017-01-01 RX ADMIN — BUDESONIDE 0.5 MG: 0.5 INHALANT RESPIRATORY (INHALATION) at 20:20

## 2017-01-01 RX ADMIN — SODIUM CHLORIDE 100 ML/HR: 4.5 INJECTION, SOLUTION INTRAVENOUS at 04:50

## 2017-01-01 RX ADMIN — IPRATROPIUM BROMIDE AND ALBUTEROL SULFATE 3 ML: .5; 3 SOLUTION RESPIRATORY (INHALATION) at 11:42

## 2017-01-01 RX ADMIN — SODIUM CHLORIDE 100 ML/HR: 4.5 INJECTION, SOLUTION INTRAVENOUS at 06:45

## 2017-01-01 RX ADMIN — HYDROCODONE BITARTRATE AND ACETAMINOPHEN 1 TABLET: 5; 325 TABLET ORAL at 14:45

## 2017-01-01 RX ADMIN — LETROZOLE 2.5 MG: 2.5 TABLET ORAL at 09:51

## 2017-01-01 RX ADMIN — IPRATROPIUM BROMIDE AND ALBUTEROL SULFATE 3 ML: .5; 3 SOLUTION RESPIRATORY (INHALATION) at 20:20

## 2017-01-01 RX ADMIN — HYDROMORPHONE HYDROCHLORIDE 1.5 MG: 2 INJECTION INTRAMUSCULAR; INTRAVENOUS; SUBCUTANEOUS at 01:47

## 2017-01-01 RX ADMIN — IPRATROPIUM BROMIDE AND ALBUTEROL SULFATE 3 ML: .5; 3 SOLUTION RESPIRATORY (INHALATION) at 14:59

## 2017-01-01 RX ADMIN — TAZOBACTAM SODIUM AND PIPERACILLIN SODIUM 3.38 G: 375; 3 INJECTION, SOLUTION INTRAVENOUS at 22:24

## 2017-01-01 RX ADMIN — IPRATROPIUM BROMIDE AND ALBUTEROL SULFATE 3 ML: .5; 3 SOLUTION RESPIRATORY (INHALATION) at 20:08

## 2017-01-01 RX ADMIN — AZITHROMYCIN DIHYDRATE 500 MG: 500 INJECTION, POWDER, LYOPHILIZED, FOR SOLUTION INTRAVENOUS at 18:17

## 2017-01-01 RX ADMIN — LEVETIRACETAM 500 MG: 500 TABLET, FILM COATED ORAL at 08:49

## 2017-01-01 RX ADMIN — NYSTATIN: 100000 POWDER TOPICAL at 06:25

## 2017-01-01 RX ADMIN — HEPARIN SODIUM 5000 UNITS: 5000 INJECTION, SOLUTION INTRAVENOUS; SUBCUTANEOUS at 06:07

## 2017-01-01 RX ADMIN — HYDROCODONE BITARTRATE AND ACETAMINOPHEN 2 TABLET: 5; 325 TABLET ORAL at 18:26

## 2017-01-01 RX ADMIN — HYDROCODONE BITARTRATE AND ACETAMINOPHEN 2 TABLET: 5; 325 TABLET ORAL at 09:50

## 2017-01-01 RX ADMIN — NYSTATIN: 100000 POWDER TOPICAL at 09:51

## 2017-01-01 RX ADMIN — NYSTATIN: 100000 POWDER TOPICAL at 15:32

## 2017-01-01 RX ADMIN — MONTELUKAST 10 MG: 10 TABLET, FILM COATED ORAL at 08:46

## 2017-01-01 RX ADMIN — SODIUM CHLORIDE SOLN NEBU 7% 4 ML: 7 NEBU SOLN at 20:25

## 2017-01-01 RX ADMIN — VANCOMYCIN HYDROCHLORIDE 2000 MG: 10 INJECTION, POWDER, LYOPHILIZED, FOR SOLUTION INTRAVENOUS at 17:22

## 2017-01-01 RX ADMIN — ATORVASTATIN CALCIUM 10 MG: 10 TABLET, FILM COATED ORAL at 21:16

## 2017-01-01 RX ADMIN — HYDROMORPHONE HYDROCHLORIDE 2 MG: 2 INJECTION INTRAMUSCULAR; INTRAVENOUS; SUBCUTANEOUS at 22:47

## 2017-01-01 RX ADMIN — BIMATOPROST 1 DROP: 0.1 SOLUTION/ DROPS OPHTHALMIC at 21:15

## 2017-01-01 RX ADMIN — LATANOPROST 1 DROP: 50 SOLUTION OPHTHALMIC at 23:32

## 2017-01-01 RX ADMIN — LEVETIRACETAM 500 MG: 500 TABLET, FILM COATED ORAL at 22:27

## 2017-01-01 RX ADMIN — POTASSIUM CHLORIDE 20 MEQ: 750 CAPSULE, EXTENDED RELEASE ORAL at 09:55

## 2017-01-01 RX ADMIN — LEVETIRACETAM 500 MG: 500 TABLET, FILM COATED ORAL at 17:20

## 2017-01-01 RX ADMIN — PANTOPRAZOLE SODIUM 20 MG: 20 TABLET, DELAYED RELEASE ORAL at 22:27

## 2017-01-01 RX ADMIN — DORZOLAMIDE HYDROCHLORIDE AND TIMOLOL MALEATE 1 DROP: 20; 5 SOLUTION/ DROPS OPHTHALMIC at 09:59

## 2017-01-01 RX ADMIN — BIMATOPROST 1 DROP: 0.1 SOLUTION/ DROPS OPHTHALMIC at 01:06

## 2017-01-01 RX ADMIN — SODIUM CHLORIDE 75 ML/HR: 9 INJECTION, SOLUTION INTRAVENOUS at 00:23

## 2017-01-01 RX ADMIN — SODIUM BICARBONATE 100 ML/HR: 84 INJECTION, SOLUTION INTRAVENOUS at 19:00

## 2017-01-01 RX ADMIN — CEFDINIR 300 MG: 300 CAPSULE ORAL at 09:55

## 2017-01-01 RX ADMIN — HYDROCODONE BITARTATE AND ACETAMINOPHEN 1 TABLET: 5; 325 TABLET ORAL at 22:31

## 2017-01-01 RX ADMIN — BUDESONIDE AND FORMOTEROL FUMARATE DIHYDRATE 2 PUFF: 160; 4.5 AEROSOL RESPIRATORY (INHALATION) at 07:27

## 2017-01-01 RX ADMIN — FLUTICASONE PROPIONATE 1 SPRAY: 50 SPRAY, METERED NASAL at 09:59

## 2017-01-01 RX ADMIN — IPRATROPIUM BROMIDE AND ALBUTEROL SULFATE 3 ML: .5; 3 SOLUTION RESPIRATORY (INHALATION) at 07:25

## 2017-01-01 RX ADMIN — IPRATROPIUM BROMIDE AND ALBUTEROL SULFATE 3 ML: .5; 3 SOLUTION RESPIRATORY (INHALATION) at 07:07

## 2017-01-01 RX ADMIN — LEVOTHYROXINE SODIUM 75 MCG: 75 TABLET ORAL at 06:54

## 2017-01-01 RX ADMIN — VANCOMYCIN HYDROCHLORIDE 2750 MG: 1 INJECTION, POWDER, LYOPHILIZED, FOR SOLUTION INTRAVENOUS at 17:42

## 2017-01-01 RX ADMIN — LEVETIRACETAM 500 MG: 500 TABLET, FILM COATED ORAL at 09:51

## 2017-01-01 RX ADMIN — HYDROCODONE BITARTATE AND ACETAMINOPHEN 1 TABLET: 5; 325 TABLET ORAL at 23:35

## 2017-01-01 RX ADMIN — TAZOBACTAM SODIUM AND PIPERACILLIN SODIUM 3.38 G: 375; 3 INJECTION, SOLUTION INTRAVENOUS at 15:50

## 2017-01-01 RX ADMIN — IPRATROPIUM BROMIDE AND ALBUTEROL SULFATE 3 ML: .5; 3 SOLUTION RESPIRATORY (INHALATION) at 20:24

## 2017-01-01 RX ADMIN — DORZOLAMIDE HYDROCHLORIDE AND TIMOLOL MALEATE 1 DROP: 20; 5 SOLUTION/ DROPS OPHTHALMIC at 11:09

## 2017-01-01 RX ADMIN — FLUTICASONE PROPIONATE 2 SPRAY: 50 SPRAY, METERED NASAL at 09:32

## 2017-01-01 RX ADMIN — LETROZOLE 2.5 MG: 2.5 TABLET ORAL at 09:55

## 2017-01-01 RX ADMIN — FUROSEMIDE 40 MG: 40 TABLET ORAL at 09:15

## 2017-01-01 RX ADMIN — IPRATROPIUM BROMIDE AND ALBUTEROL SULFATE 3 ML: .5; 3 SOLUTION RESPIRATORY (INHALATION) at 19:22

## 2017-01-01 RX ADMIN — HYDROCODONE BITARTATE AND ACETAMINOPHEN 1 TABLET: 5; 325 TABLET ORAL at 11:46

## 2017-01-01 RX ADMIN — CETIRIZINE HYDROCHLORIDE 5 MG: 10 TABLET, FILM COATED ORAL at 23:32

## 2017-01-01 RX ADMIN — PANTOPRAZOLE SODIUM 40 MG: 40 TABLET, DELAYED RELEASE ORAL at 06:20

## 2017-01-01 RX ADMIN — SODIUM CHLORIDE 100 ML/HR: 4.5 INJECTION, SOLUTION INTRAVENOUS at 16:28

## 2017-01-01 RX ADMIN — FLUTICASONE PROPIONATE 2 SPRAY: 50 SPRAY, METERED NASAL at 11:08

## 2017-01-01 RX ADMIN — DORZOLAMIDE HYDROCHLORIDE AND TIMOLOL MALEATE 1 DROP: 20; 5 SOLUTION/ DROPS OPHTHALMIC at 18:24

## 2017-01-01 RX ADMIN — AMLODIPINE BESYLATE 5 MG: 5 TABLET ORAL at 23:34

## 2017-01-01 RX ADMIN — SCOPALAMINE 1 PATCH: 1 PATCH, EXTENDED RELEASE TRANSDERMAL at 09:11

## 2017-01-01 RX ADMIN — TAZOBACTAM SODIUM AND PIPERACILLIN SODIUM 4.5 G: 500; 4 INJECTION, SOLUTION INTRAVENOUS at 16:48

## 2017-01-01 RX ADMIN — AMLODIPINE BESYLATE 5 MG: 5 TABLET ORAL at 09:55

## 2017-01-01 RX ADMIN — SODIUM CHLORIDE SOLN NEBU 7% 4 ML: 7 NEBU SOLN at 20:15

## 2017-01-01 RX ADMIN — SODIUM CHLORIDE 125 ML/HR: 9 INJECTION, SOLUTION INTRAVENOUS at 14:56

## 2017-01-01 RX ADMIN — IPRATROPIUM BROMIDE AND ALBUTEROL SULFATE 3 ML: .5; 3 SOLUTION RESPIRATORY (INHALATION) at 20:42

## 2017-01-01 RX ADMIN — POTASSIUM CHLORIDE 20 MEQ: 750 CAPSULE, EXTENDED RELEASE ORAL at 17:18

## 2017-01-01 RX ADMIN — BIMATOPROST 1 DROP: 0.1 SOLUTION/ DROPS OPHTHALMIC at 21:17

## 2017-01-01 RX ADMIN — SODIUM CHLORIDE 75 ML/HR: 9 INJECTION, SOLUTION INTRAVENOUS at 14:45

## 2017-01-01 RX ADMIN — ASPIRIN 81 MG: 81 TABLET, CHEWABLE ORAL at 08:47

## 2017-01-01 RX ADMIN — LATANOPROST 1 DROP: 50 SOLUTION OPHTHALMIC at 21:13

## 2017-01-01 RX ADMIN — SODIUM CHLORIDE SOLN NEBU 7% 4 ML: 7 NEBU SOLN at 20:39

## 2017-01-01 RX ADMIN — SODIUM CHLORIDE SOLN NEBU 7% 4 ML: 7 NEBU SOLN at 15:52

## 2017-01-01 RX ADMIN — HYDROCODONE BITARTATE AND ACETAMINOPHEN 1 TABLET: 5; 325 TABLET ORAL at 14:31

## 2017-01-01 RX ADMIN — DORZOLAMIDE HYDROCHLORIDE AND TIMOLOL MALEATE 1 DROP: 20; 5 SOLUTION/ DROPS OPHTHALMIC at 09:16

## 2017-01-01 RX ADMIN — LEVOTHYROXINE SODIUM 75 MCG: 75 TABLET ORAL at 06:25

## 2017-01-01 RX ADMIN — SODIUM CHLORIDE SOLN NEBU 7% 4 ML: 7 NEBU SOLN at 07:25

## 2017-01-01 RX ADMIN — FLUTICASONE PROPIONATE 2 SPRAY: 50 SPRAY, METERED NASAL at 08:47

## 2017-01-01 RX ADMIN — SODIUM CHLORIDE 100 ML/HR: 4.5 INJECTION, SOLUTION INTRAVENOUS at 17:18

## 2017-01-01 RX ADMIN — ASPIRIN 81 MG: 81 TABLET, CHEWABLE ORAL at 23:33

## 2017-01-01 RX ADMIN — PANTOPRAZOLE SODIUM 40 MG: 40 TABLET, DELAYED RELEASE ORAL at 07:03

## 2017-01-01 RX ADMIN — HEPARIN SODIUM 5000 UNITS: 5000 INJECTION, SOLUTION INTRAVENOUS; SUBCUTANEOUS at 13:51

## 2017-01-01 RX ADMIN — HEPARIN SODIUM 5000 UNITS: 5000 INJECTION, SOLUTION INTRAVENOUS; SUBCUTANEOUS at 07:19

## 2017-01-01 RX ADMIN — HEPARIN SODIUM 5000 UNITS: 5000 INJECTION, SOLUTION INTRAVENOUS; SUBCUTANEOUS at 21:13

## 2017-01-01 RX ADMIN — HEPARIN SODIUM 5000 UNITS: 5000 INJECTION, SOLUTION INTRAVENOUS; SUBCUTANEOUS at 06:55

## 2017-01-01 RX ADMIN — SODIUM CHLORIDE SOLN NEBU 7% 4 ML: 7 NEBU SOLN at 12:42

## 2017-01-01 RX ADMIN — NYSTATIN: 100000 POWDER TOPICAL at 21:13

## 2017-01-01 RX ADMIN — DORZOLAMIDE HYDROCHLORIDE AND TIMOLOL MALEATE 1 DROP: 20; 5 SOLUTION/ DROPS OPHTHALMIC at 17:22

## 2017-01-01 RX ADMIN — HEPARIN SODIUM 5000 UNITS: 5000 INJECTION, SOLUTION INTRAVENOUS; SUBCUTANEOUS at 22:28

## 2017-01-01 RX ADMIN — PANTOPRAZOLE SODIUM 20 MG: 20 TABLET, DELAYED RELEASE ORAL at 06:55

## 2017-01-01 RX ADMIN — CARVEDILOL 3.12 MG: 3.12 TABLET, FILM COATED ORAL at 08:49

## 2017-01-01 RX ADMIN — ENOXAPARIN SODIUM 30 MG: 30 INJECTION SUBCUTANEOUS at 23:32

## 2017-01-01 RX ADMIN — HYDROMORPHONE HYDROCHLORIDE 2 MG: 2 INJECTION INTRAMUSCULAR; INTRAVENOUS; SUBCUTANEOUS at 03:43

## 2017-01-01 RX ADMIN — Medication: at 21:16

## 2017-01-01 RX ADMIN — FLUTICASONE PROPIONATE 1 SPRAY: 50 SPRAY, METERED NASAL at 18:49

## 2017-01-01 RX ADMIN — FUROSEMIDE 40 MG: 40 TABLET ORAL at 09:55

## 2017-01-01 RX ADMIN — NYSTATIN: 100000 POWDER TOPICAL at 21:16

## 2017-01-01 RX ADMIN — HYDROCODONE BITARTRATE AND ACETAMINOPHEN 2 TABLET: 5; 325 TABLET ORAL at 05:39

## 2017-01-01 RX ADMIN — HYDROMORPHONE HYDROCHLORIDE 2 MG: 2 INJECTION INTRAMUSCULAR; INTRAVENOUS; SUBCUTANEOUS at 04:20

## 2017-01-01 RX ADMIN — LEVETIRACETAM 500 MG: 500 TABLET, FILM COATED ORAL at 21:15

## 2017-01-01 RX ADMIN — CARVEDILOL 3.12 MG: 3.12 TABLET, FILM COATED ORAL at 17:34

## 2017-01-01 RX ADMIN — AZITHROMYCIN DIHYDRATE 500 MG: 500 INJECTION, POWDER, LYOPHILIZED, FOR SOLUTION INTRAVENOUS at 16:27

## 2017-01-01 RX ADMIN — FLUTICASONE PROPIONATE 1 SPRAY: 50 SPRAY, METERED NASAL at 09:16

## 2017-01-01 RX ADMIN — PROCHLORPERAZINE MALEATE 5 MG: 5 TABLET, FILM COATED ORAL at 01:22

## 2017-01-01 RX ADMIN — HYDROCODONE BITARTATE AND ACETAMINOPHEN 1 TABLET: 5; 325 TABLET ORAL at 12:17

## 2017-01-01 RX ADMIN — LEVOTHYROXINE SODIUM 75 MCG: 75 TABLET ORAL at 05:40

## 2017-01-01 RX ADMIN — BUDESONIDE 0.5 MG: 0.5 INHALANT RESPIRATORY (INHALATION) at 07:25

## 2017-01-01 RX ADMIN — NYSTATIN: 100000 POWDER TOPICAL at 21:15

## 2017-01-01 RX ADMIN — LEVOTHYROXINE SODIUM 75 MCG: 75 TABLET ORAL at 09:32

## 2017-01-01 RX ADMIN — NYSTATIN: 100000 POWDER TOPICAL at 14:31

## 2017-01-01 RX ADMIN — HEPARIN SODIUM 5000 UNITS: 5000 INJECTION, SOLUTION INTRAVENOUS; SUBCUTANEOUS at 13:44

## 2017-01-01 RX ADMIN — LEVOFLOXACIN 750 MG: 5 INJECTION, SOLUTION INTRAVENOUS at 00:29

## 2017-01-01 RX ADMIN — BUDESONIDE AND FORMOTEROL FUMARATE DIHYDRATE 2 PUFF: 160; 4.5 AEROSOL RESPIRATORY (INHALATION) at 07:07

## 2017-01-01 RX ADMIN — NYSTATIN: 100000 POWDER TOPICAL at 07:31

## 2017-01-01 RX ADMIN — SODIUM CHLORIDE 100 ML/HR: 4.5 INJECTION, SOLUTION INTRAVENOUS at 07:20

## 2017-01-01 RX ADMIN — SODIUM CHLORIDE SOLN NEBU 7% 4 ML: 7 NEBU SOLN at 07:35

## 2017-01-01 RX ADMIN — CEFTRIAXONE SODIUM 1 G: 1 INJECTION, SOLUTION INTRAVENOUS at 14:31

## 2017-01-01 RX ADMIN — LEVETIRACETAM 500 MG: 500 TABLET, FILM COATED ORAL at 09:13

## 2017-01-01 RX ADMIN — IPRATROPIUM BROMIDE AND ALBUTEROL SULFATE 3 ML: .5; 3 SOLUTION RESPIRATORY (INHALATION) at 14:28

## 2017-01-01 RX ADMIN — PANTOPRAZOLE SODIUM 20 MG: 20 TABLET, DELAYED RELEASE ORAL at 07:37

## 2017-01-01 RX ADMIN — SODIUM CHLORIDE, POTASSIUM CHLORIDE, SODIUM LACTATE AND CALCIUM CHLORIDE 30 ML/HR: 600; 310; 30; 20 INJECTION, SOLUTION INTRAVENOUS at 11:53

## 2017-01-01 RX ADMIN — LETROZOLE 2.5 MG: 2.5 TABLET ORAL at 09:14

## 2017-01-01 RX ADMIN — MONTELUKAST 10 MG: 10 TABLET, FILM COATED ORAL at 09:15

## 2017-01-01 RX ADMIN — HYDROMORPHONE HYDROCHLORIDE 2 MG: 2 INJECTION INTRAMUSCULAR; INTRAVENOUS; SUBCUTANEOUS at 10:52

## 2017-01-01 RX ADMIN — LATANOPROST 1 DROP: 50 SOLUTION OPHTHALMIC at 21:12

## 2017-01-01 RX ADMIN — Medication: at 09:59

## 2017-01-01 RX ADMIN — BIMATOPROST 1 DROP: 0.1 SOLUTION/ DROPS OPHTHALMIC at 22:27

## 2017-01-01 RX ADMIN — MONTELUKAST 10 MG: 10 TABLET, FILM COATED ORAL at 18:49

## 2017-01-01 RX ADMIN — ATORVASTATIN CALCIUM 10 MG: 10 TABLET, FILM COATED ORAL at 21:15

## 2017-01-01 RX ADMIN — IPRATROPIUM BROMIDE AND ALBUTEROL SULFATE 3 ML: .5; 3 SOLUTION RESPIRATORY (INHALATION) at 20:38

## 2017-01-01 RX ADMIN — CEFDINIR 300 MG: 300 CAPSULE ORAL at 21:15

## 2017-01-01 RX ADMIN — MONTELUKAST 10 MG: 10 TABLET, FILM COATED ORAL at 09:51

## 2017-01-01 RX ADMIN — LATANOPROST 1 DROP: 50 SOLUTION OPHTHALMIC at 21:55

## 2017-01-01 RX ADMIN — DORZOLAMIDE HYDROCHLORIDE AND TIMOLOL MALEATE 1 DROP: 20; 5 SOLUTION/ DROPS OPHTHALMIC at 17:58

## 2017-01-01 RX ADMIN — HYDROCODONE BITARTRATE AND ACETAMINOPHEN 2 TABLET: 5; 325 TABLET ORAL at 03:17

## 2017-01-01 RX ADMIN — CETIRIZINE HYDROCHLORIDE 5 MG: 10 TABLET, FILM COATED ORAL at 09:32

## 2017-01-01 RX ADMIN — CARVEDILOL 3.12 MG: 3.12 TABLET, FILM COATED ORAL at 09:51

## 2017-01-01 RX ADMIN — PANTOPRAZOLE SODIUM 20 MG: 20 TABLET, DELAYED RELEASE ORAL at 18:49

## 2017-01-01 RX ADMIN — SODIUM CHLORIDE 250 ML: 9 INJECTION, SOLUTION INTRAVENOUS at 11:10

## 2017-01-01 RX ADMIN — HEPARIN SODIUM 5000 UNITS: 5000 INJECTION, SOLUTION INTRAVENOUS; SUBCUTANEOUS at 07:37

## 2017-01-01 RX ADMIN — NYSTATIN: 100000 POWDER TOPICAL at 14:15

## 2017-01-01 RX ADMIN — CETIRIZINE HYDROCHLORIDE 5 MG: 10 TABLET, FILM COATED ORAL at 08:46

## 2017-01-01 RX ADMIN — SODIUM CHLORIDE 75 ML/HR: 9 INJECTION, SOLUTION INTRAVENOUS at 22:26

## 2017-01-01 RX ADMIN — SODIUM CHLORIDE SOLN NEBU 7% 4 ML: 7 NEBU SOLN at 20:47

## 2017-01-01 RX ADMIN — BUMETANIDE 2 MG: 0.25 INJECTION, SOLUTION INTRAMUSCULAR; INTRAVENOUS at 15:50

## 2017-01-01 RX ADMIN — CEFTRIAXONE SODIUM 1 G: 1 INJECTION, SOLUTION INTRAVENOUS at 14:14

## 2017-01-01 RX ADMIN — SODIUM CHLORIDE SOLN NEBU 7% 4 ML: 7 NEBU SOLN at 07:28

## 2017-01-01 RX ADMIN — HYDROMORPHONE HYDROCHLORIDE 1 MG: 10 INJECTION INTRAMUSCULAR; INTRAVENOUS; SUBCUTANEOUS at 17:17

## 2017-01-01 RX ADMIN — HEPARIN SODIUM 5000 UNITS: 5000 INJECTION, SOLUTION INTRAVENOUS; SUBCUTANEOUS at 14:29

## 2017-01-01 RX ADMIN — Medication: at 09:16

## 2017-01-01 RX ADMIN — DORZOLAMIDE HYDROCHLORIDE AND TIMOLOL MALEATE 1 DROP: 20; 5 SOLUTION/ DROPS OPHTHALMIC at 23:31

## 2017-01-01 RX ADMIN — FLUTICASONE PROPIONATE 2 SPRAY: 50 SPRAY, METERED NASAL at 23:32

## 2017-01-01 RX ADMIN — HEPARIN SODIUM 5000 UNITS: 5000 INJECTION, SOLUTION INTRAVENOUS; SUBCUTANEOUS at 06:25

## 2017-01-01 RX ADMIN — PANTOPRAZOLE SODIUM 20 MG: 20 TABLET, DELAYED RELEASE ORAL at 06:25

## 2017-01-01 RX ADMIN — PROPOFOL 200 MG: 10 INJECTION, EMULSION INTRAVENOUS at 12:15

## 2017-01-01 RX ADMIN — CEFTRIAXONE SODIUM 1 G: 1 INJECTION, SOLUTION INTRAVENOUS at 17:14

## 2017-01-01 RX ADMIN — HEPARIN SODIUM 5000 UNITS: 5000 INJECTION, SOLUTION INTRAVENOUS; SUBCUTANEOUS at 21:55

## 2017-04-03 PROBLEM — J90 PLEURAL EFFUSION, LEFT: Status: ACTIVE | Noted: 2017-01-01

## 2017-04-03 NOTE — NURSING NOTE
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 Will continue with contact isolation.

## 2017-04-03 NOTE — H&P
Patient Care Team:  Khanh Chaudhary MD as PCP - General (Internal Medicine)  Christine Calderon MD as Consulting Physician (Hematology and Oncology)    Chief complaint:  Cough and chest pain    History of present illness:  This is a 72-year-old female patient, former smoker (15 packs year), with history of COPD, who presented with cough.  It started 10 days ago and has been getting worse.  It's productive of large thick whitish phlegm.  Associated symptoms include dyspnea, not resolving by her rescue inhaler which she has been using frequently.  She also reported chest pain which initially started on the left back but recently migrated to the left anterior chest.  It's pleuritic in nature.    She was visited by M.D. to you and had an x-ray at home which reportedly showed whiteout left lung and therefore she was treated with levofloxacin which she finished.  She reported very minimal improvement with the antibiotic.    Patient has a history of right breast cancer, S/PE lumpectomy 2 years ago and also had 1 session of chemotherapy which she did not tolerate.  He did not have radiation.  She is currently on hormonal therapy.  She has been bedridden over the last year following the effect of chemotherapy which resulted in profound weakness.      Echo on 5/17/15: EF=65%; Grade 1A diastolic dysfunction    Labs:  WBC=5.6 k; Hb=11.5; Sqfv=983; Cr=1.2 (baseline<1); Nr=650; Bicar=29    Review of Systems:  Constitutional: No fever or chills.   ENMT: No sinus congestion  Cardiovascular: Pleuritic chest pain as reported above. no palpitation or legs swelling.    Respiratory: Dyspnea and cough as above.  No wheezing  Gastrointestinal: No constipation, diarrhea or abdominal pain   Neurology: No headache, numbness or dizziness.  Generalized weakness.  Unable to walk.  Bedridden  Musculoskeletal: No joints pain, stiffness or swelling.   Psychiatry: No depression.  Lymphatic: No swollen glands.  Integumentary: No  rash.    History  Past Medical History:   Diagnosis Date   • Acute myocardial infarction 08/2014   • Anemia     Chronic disease   • CHF (congestive heart failure)    • Chronic kidney disease     Acute on chronic, requiring an episode of hemodialysis in 05/2015 and 06/2015   • Chronic obstructive pulmonary disease    • Cirrhosis     Based on imaging   • Dialysis patient    • H/O Altered mental status 10/29/2015    and seizure   • H/O Musculoskeletal pain    • H/O Pulmonary embolism     Diagnosed in 2007, on chronic anticoagulation; no known thrombophilia disorder   • History of being hospitalized     At the end of 10/2015 with respiratory failure requiring ventilation with congestive heart failure, chronic back spasms, intracranial hemorhage, and Proteus urinary tract infection.   • History of chronic pain     Following with Pain Management   • History of intracranial hemorrhage 10/2015   • History of respiratory failure     Required intubation   • Hyperlipidemia    • Hypertension    • Hypothyroidism    • Immobility syndrome    • Low back pain    • Lymphedema     Right-sided   • Malignant neoplasm of breast     Invasive ductal carcinoma breast, ER/[place] positive, HER2/hima over amplified.   • Morbid obesity    • Obstructive sleep apnea     Noncompliant with CPAp   • Peripheral neuropathy    • Seizures 10/2015    H/O seizure activity and a small intracranial hemorrhage.   • Traumatic subdural hematoma 08/2015     Past Surgical History:   Procedure Laterality Date   • BACK SURGERY  01/13/2016    I and D of thoracic wound infection-Dr. Esparza    • BACK SURGERY  12/24/2015    T11-T12 laminectomy fro bilateral transpedicular discectomy and I and D of T-11-T12 -Dr. Esparza   • BREAST BIOPSY Right     Of thickened area on the right breast; an FNA was performed initially that showed rare cluster of atypical epithelial cells.   • BREAST BIOPSY  10/02/2015    Punch biopsy by Dr. Haque was benign   • BREAST LUMPECTOMY  Right 05/2015   • BREAST LUMPECTOMY WITH SENTINEL NODE BIOPSY  03/04/2015   • CHOLECYSTECTOMY     • HYSTERECTOMY     • TONSILLECTOMY       Family History   Problem Relation Age of Onset   • Heart disease Mother    • Hypertension Mother    • Lung cancer Father 66     Social History   Substance Use Topics   • Smoking status: Former Smoker     Packs/day: 1.00     Years: 15.00   • Smokeless tobacco: None   • Alcohol use No       (Not in a hospital admission)  Allergies:  Latex; Mometasone furo-formoterol fum; and Other    Objective     Vital Signs  Temp:  [98.4 °F (36.9 °C)] 98.4 °F (36.9 °C)  Heart Rate:  [69-70] 69  Resp:  [18] 18  BP: (130)/(96) 130/96    Physical Exam:  Constitutional: Not in acute distress.  Eyes: Injected conjunctiva, EOMI.  ENMT: Garcia 3. No oral thrush.  Dental caries  Heart: RRR, systolic murmur grade 3/6 over the left sternal border and pulmonary area  Lungs: Decreased air entry over the left.  Good air entry on the right.  No crackles or wheezing.  Abdomen: Obese. Soft. No tenderness or dullness.  Extremities: No cyanosis, clubbing or pitting edema. Moves all extremities.  Neuro: Conscious, alert, oriented x3  Psych: Appropriate mood and affect.    Integumentary: No rash  Lymphatic: No palpable cervical or supraclavicular lymph nodes.      Diagnostic imaging:  I personally and independently reviewed the following images:   Opacification of the left lung with mediastinal shift toward the left      CT chest 4/3/17: There is minimal left pleural effusion.  There is atelectasis of the left upper lobe/lingula with consolidation of the left lower lobe.  There is calcification of the trachea with possible bronchial obstruction on the left         CXR 2015      Assessment and Plan:    1) Left upper lobe atelectasis: possible left bronchial obstruction on CT. Mucous plug vs tumor.  Needs bronchoscopy.  I discussed that with her and she is agreeable. She was concerned about her underlying COPD. I  explained the potential minimal risk of respiraotry failure requiring MV. Keep NPO for potential bronch today.   -Start hypertonic saline twice a day    2) Acute hypoxic resp failure: 2ary to #1 on top of COPD    3) LLL consolidation: possibly residual pneumonia. WBC normal. Afebrile.   Checked Procal and it's elevated.   - Start Rocephin and Azithromycin awaiting BAL culture    4) Minimal left pleural effusion, trapped lung: Not enough to tap    5) COPD, no current exacerbation: DuoNeb every 6 hours    6) Mild hypernatremia: Hydrate with half-normal saline    7) Mild MARGARITO: IV hydration    8) history of right breast cancer: Needs follow-up.  She has not been able to do so because of being a creatinine over the last year              I discussed the patients findings and my recommendations with patient and family.     Shira Parson MD  04/03/17  2:55 AM            EMR Dragon/Transcription disclaimer:   Much of this encounter note is an electronic transcription/translation of spoken language to printed text. The electronic translation of spoken language may permit erroneous, or at times, nonsensical words or phrases to be inadvertently transcribed; Although I have reviewed the note for such errors, some may still exist.

## 2017-04-03 NOTE — PLAN OF CARE
Problem: Fall Risk (Adult)  Goal: Identify Related Risk Factors and Signs and Symptoms  Outcome: Ongoing (interventions implemented as appropriate)    04/03/17 1519   Fall Risk   Fall Risk: Related Risk Factors environment unfamiliar   Fall Risk: Signs and Symptoms presence of risk factors       Goal: Absence of Falls  Outcome: Ongoing (interventions implemented as appropriate)    04/03/17 1519   Fall Risk (Adult)   Absence of Falls making progress toward outcome         Problem: Patient Care Overview (Adult)  Goal: Plan of Care Review  Outcome: Ongoing (interventions implemented as appropriate)    04/03/17 1519   Coping/Psychosocial Response Interventions   Plan Of Care Reviewed With patient   Patient Care Overview   Progress no change   Outcome Evaluation   Outcome Summary/Follow up Plan Patient will be NPO tonight for bronch tomorrow. A/O today, moving in bed, productive cough. Report of pain, tx PO meds. Encouraging IS and continuing to monitor respiratory status.         Problem: Respiratory Insufficiency (Adult)  Goal: Effective Ventilation  Outcome: Ongoing (interventions implemented as appropriate)    04/03/17 1519   Respiratory Insufficiency (Adult)   Effective Ventilation making progress toward outcome         Problem: Skin Integrity Impairment, Risk/Actual (Adult)  Goal: Identify Related Risk Factors and Signs and Symptoms  Outcome: Ongoing (interventions implemented as appropriate)    04/03/17 1519   Skin Integrity Impairment, Risk/Actual   Skin Integrity Impairment, Risk/Actual: Related Risk Factors immobility;moisture       Goal: Skin Integrity/Wound Healing  Outcome: Ongoing (interventions implemented as appropriate)    04/03/17 1519   Skin Integrity Impairment, Risk/Actual (Adult)   Skin Integrity/Wound Healing making progress toward outcome

## 2017-04-03 NOTE — ED PROVIDER NOTES
EMERGENCY DEPARTMENT ENCOUNTER    CHIEF COMPLAINT  Chief Complaint: SOA  History given by: pt  History limited by: none  Room Number: 13/13  PMD: Khanh Chaudhary MD      HPI:  Pt is a 72 y.o. female who presents complaining of worsening SOA for the past several days. She was seen by [facility], and prescribed Levaquin for pneumonia  w/o improvement. She c/o productive cough with clear sputum. She is on 1.5L home O2 and has been using her rescue in [facility] more frequently. She denies fever, CP and other complaints at this time.     PCP- [facility]    Duration:  Several days   Onset: gradual   Timing: constant   Location: n/a  Radiation: none  Quality: recently treated with Levaquin   Intensity/Severity: moderate   Progression: worsening   Associated Symptoms: cough  Aggravating Factors: none  Alleviating Factors: none  Previous Episodes: none  Treatment before arrival: She was recently treated with Levaquin for pna.     PAST MEDICAL HISTORY  Active Ambulatory Problems     Diagnosis Date Noted   • Congestive heart failure 01/28/2016   • Coagulation factor deficiency syndrome 01/28/2016   • Gastrointestinal ulcer due to Helicobacter pylori 01/28/2016   • Hyperlipidemia 01/28/2016   • Hypertension 01/28/2016   • Hypokalemia 01/28/2016   • Hypothyroidism 01/28/2016   • Meningioma 01/28/2016   • Meralgia paresthetica 01/28/2016   • Chronic obstructive pulmonary disease with acute exacerbation 01/28/2016   • Impaired glucose tolerance 01/28/2016   • Cobalamin deficiency 01/28/2016   • Vitamin D deficiency 01/28/2016   • Follow-up examination, following other surgery 01/28/2016   • Midline low back pain without sciatica 04/04/2016   • Chronic pain 04/11/2016   • DDD (degenerative disc disease), lumbar 04/11/2016   • Lumbar radiculopathy 04/11/2016   • Long term current use of opiate analgesic 04/11/2016   • History of discitis 06/22/2016   • Malignant neoplasm of upper-outer quadrant of right female breast 10/04/2014      Resolved Ambulatory Problems     Diagnosis Date Noted   • Thoracic discitis 05/05/2016     Past Medical History:   Diagnosis Date   • Acute myocardial infarction 08/2014   • Anemia    • CHF (congestive heart failure)    • Chronic kidney disease    • Chronic obstructive pulmonary disease    • Cirrhosis    • Dialysis patient    • H/O Altered mental status 10/29/2015   • H/O Musculoskeletal pain    • H/O Pulmonary embolism    • History of being hospitalized    • History of chronic pain    • History of intracranial hemorrhage 10/2015   • History of respiratory failure    • Hyperlipidemia    • Hypertension    • Hypothyroidism    • Immobility syndrome    • Low back pain    • Lymphedema    • Malignant neoplasm of breast    • Morbid obesity    • Obstructive sleep apnea    • Peripheral neuropathy    • Seizures 10/2015   • Traumatic subdural hematoma 08/2015       PAST SURGICAL HISTORY  Past Surgical History:   Procedure Laterality Date   • BACK SURGERY  01/13/2016    I and D of thoracic wound infection-Dr. Esparza    • BACK SURGERY  12/24/2015    T11-T12 laminectomy fro bilateral transpedicular discectomy and I and D of T-11-T12 -Dr. Esparza   • BREAST BIOPSY Right     Of thickened area on the right breast; an FNA was performed initially that showed rare cluster of atypical epithelial cells.   • BREAST BIOPSY  10/02/2015    Punch biopsy by Dr. Haque was benign   • BREAST LUMPECTOMY Right 05/2015   • BREAST LUMPECTOMY WITH SENTINEL NODE BIOPSY  03/04/2015   • CHOLECYSTECTOMY     • HYSTERECTOMY     • TONSILLECTOMY         FAMILY HISTORY  Family History   Problem Relation Age of Onset   • Heart disease Mother    • Hypertension Mother    • Lung cancer Father 66       SOCIAL HISTORY  Social History     Social History   • Marital status:      Spouse name: N/A   • Number of children: N/A   • Years of education: N/A     Occupational History   • Retired research       Social History Main Topics   • Smoking  status: Former Smoker     Packs/day: 1.00     Years: 15.00   • Smokeless tobacco: Not on file   • Alcohol use No   • Drug use: No   • Sexual activity: Not on file     Other Topics Concern   • Not on file     Social History Narrative       ALLERGIES  Latex; Mometasone furo-formoterol fum; and Other    REVIEW OF SYSTEMS  Review of Systems   Constitutional: Negative for fever.   HENT: Negative for sore throat.    Eyes: Negative.    Respiratory: Positive for cough and shortness of breath.    Cardiovascular: Negative for chest pain.   Gastrointestinal: Negative for abdominal pain, diarrhea and vomiting.   Genitourinary: Negative for dysuria.   Musculoskeletal: Negative for neck pain.   Skin: Negative for rash.   Allergic/Immunologic: Negative.    Neurological: Negative for weakness, numbness and headaches.   Hematological: Negative.    Psychiatric/Behavioral: Negative.    All other systems reviewed and are negative.      PHYSICAL EXAM  ED Triage Vitals   Temp Heart Rate Resp BP SpO2   04/03/17 0032 04/03/17 0032 04/03/17 0032 04/03/17 0032 04/03/17 0032   98.4 °F (36.9 °C) 70 18 130/96 93 %      Temp src Heart Rate Source Patient Position BP Location FiO2 (%)   -- -- -- -- --              Physical Exam   Constitutional: She is oriented to person, place, and time and well-developed, well-nourished, and in no distress. No distress.   HENT:   Head: Normocephalic and atraumatic.   Eyes: EOM are normal. Pupils are equal, round, and reactive to light.   Neck: Normal range of motion. Neck supple.   Cardiovascular: Normal rate, regular rhythm and normal heart sounds.    Pulmonary/Chest: Effort normal. No respiratory distress.   No breath sounds on L side.    Abdominal: Soft. There is no tenderness. There is no rebound and no guarding.   Morbidly obese    Musculoskeletal: Normal range of motion. She exhibits no edema or tenderness (calf).   Neurological: She is alert and oriented to person, place, and time. She has normal  sensation and normal strength.   Skin: Skin is warm and dry. No rash noted.   Psychiatric: Mood and affect normal.   Nursing note and vitals reviewed.      LAB RESULTS  Lab Results (last 24 hours)     Procedure Component Value Units Date/Time    CBC & Differential [[id number]] Collected:  04/03/17 0158    Specimen:  Blood Updated:  04/03/17 0221    Narrative:       The following orders were created for panel order CBC & Differential.  Procedure                               Abnormality         Status                     ---------                               -----------         ------                     Scan Slide[[id number]]                                                                   CBC Auto Differential[[id number]]         Abnormal            Final result                 Please view results for these tests on the individual orders.    Comprehensive Metabolic Panel [[id number]]  (Abnormal) Collected:  04/03/17 0158    Specimen:  Blood Updated:  04/03/17 0230     Glucose 136 (H) mg/dL      BUN 21 mg/dL      Creatinine 1.20 (H) mg/dL      Sodium 146 (H) mmol/L      Potassium 3.7 mmol/L      Chloride 106 mmol/L      CO2 29.6 (H) mmol/L      Calcium 8.5 (L) mg/dL      Total Protein 5.8 (L) g/dL      Albumin 2.60 (L) g/dL      ALT (SGPT) 12 U/L      AST (SGOT) 28 U/L      Alkaline Phosphatase 103 U/L      Total Bilirubin 0.2 mg/dL      eGFR Non African Amer 44 (L) mL/min/1.73      Globulin 3.2 gm/dL      A/G Ratio 0.8 g/dL      BUN/Creatinine Ratio 17.5     Anion Gap 10.4 mmol/L     Narrative:       The MDRD GFR formula is only valid for adults with stable renal function between ages 18 and 70.    BNP [[id number]]  (Abnormal) Collected:  04/03/17 0158    Specimen:  Blood Updated:  04/03/17 0229     proBNP 913.1 (H) pg/mL     Narrative:       Among patients with dyspnea, NT-proBNP is highly sensitive for the detection of acute congestive heart failure. In addition NT-proBNP of <300 pg/ml effectively rules out acute  congestive heart failure with 99% negative predictive value.    Troponin [[id number]]  (Normal) Collected:  04/03/17 0158    Specimen:  Blood Updated:  04/03/17 0230     Troponin T <0.010 ng/mL     Narrative:       Troponin T Reference Ranges:  Less than 0.03 ng/mL:    Negative for AMI  0.03 to 0.09 ng/mL:      Indeterminant for AMI  Greater than 0.09 ng/mL: Positive for AMI    CBC Auto Differential [[id number]]  (Abnormal) Collected:  04/03/17 0158    Specimen:  Blood Updated:  04/03/17 0221     WBC 5.64 10*3/mm3      RBC 3.56 (L) 10*6/mm3      Hemoglobin 11.5 (L) g/dL      Hematocrit 36.6 %      .8 (H) fL      MCH 32.3 (H) pg      MCHC 31.4 (L) g/dL      RDW 13.0 %      RDW-SD 48.9 fl      MPV 10.3 fL      Platelets 162 10*3/mm3      Neutrophil % 59.5 %      Lymphocyte % 27.3 %      Monocyte % 6.9 %      Eosinophil % 5.9 %      Basophil % 0.4 %      Immature Grans % 0.0 %      Neutrophils, Absolute 3.36 10*3/mm3      Lymphocytes, Absolute 1.54 10*3/mm3      Monocytes, Absolute 0.39 10*3/mm3      Eosinophils, Absolute 0.33 10*3/mm3      Basophils, Absolute 0.02 10*3/mm3      Immature Grans, Absolute 0.00 10*3/mm3           I ordered the above labs and reviewed the results    RADIOLOGY  CT Chest Without Contrast   Final Result      XR Chest 2 View    (Results Pending)       CT Chest showed left upper lobe collapse and left lower lobe consolidation. No abel-pneumonic effusion.       I ordered the above noted radiological studies. Interpreted by radiologist. Discussed with radiologist (Dr. Huizar). Reviewed by me in PACS.             PROCEDURES  Procedures  EKG           EKG time: 203  Rhythm/Rate: NSR, 69   1st degree AV block   No acute ST segment change     Interpreted Contemporaneously by me, independently viewed  unchanged compared to prior from 12/21/15    PROGRESS AND CONSULTS  ED Course     00:34 Ordered blood work, BNP, Troponin, EKG and CXR for further evaluation.     02:34 CXR showed pleural effusion.  Ordered CT Chest for further evaluation.     02:42 Placed call to pulmonary for admission.     02:44 Spoke with Dr. Parson (pulmonary) who will admit pt.     03:07 Rechecked pt. Dr. Parson (pulmonary) is at bedside. Discussed CT results which showed  left upper lobe collapse and left lower lobe consolidation. Informed of plan to admit. Addressed all questions. They agree with plan.         MEDICAL DECISION MAKING  Results were reviewed/discussed with the patient and they were also made aware of online access. .     MDM  Number of Diagnoses or Management Options     Amount and/or Complexity of Data Reviewed  Clinical lab tests: reviewed and ordered (BNP - 913   Negative Troponin   )  Tests in the radiology section of CPT®: ordered and reviewed (CT Chest showed left upper lobe collapse and left lower lobe consolidation. No abel-pneumonic effusion. )  Tests in the medicine section of CPT®: ordered and reviewed (EKG)  Discussion of test results with the performing providers: yes (Discussed with radiologist (Dr. Huizar).  )  Decide to obtain previous medical records or to obtain history from someone other than the patient: yes  Review and summarize past medical records: yes  Discuss the patient with other providers: yes (D/w Dr. Parson (pulmonary) )  Independent visualization of images, tracings, or specimens: yes           DIAGNOSIS  Final diagnoses:   Pleural effusion, left       DISPOSITION  ADMISSION    Discussed treatment plan and reason for admission with pt/family and admitting physician.  Pt/family voiced understanding of the plan for admission for further testing/treatment as needed.         Latest Documented Vital Signs:  As of 3:40 AM  BP- 130/96 HR- 69 Temp- 98.4 °F (36.9 °C) O2 sat- 95%    --  Documentation assistance provided by joleen Adams for Dr. Parker.  Information recorded by the joleen was done at my direction and has been verified and validated by me.     Ivanna Adams  04/03/17  0346       Robe Parker MD  04/03/17 0421

## 2017-04-03 NOTE — PLAN OF CARE
Problem: Pneumonia (Adult)  Goal: Signs and Symptoms of Listed Potential Problems Will be Absent or Manageable (Pneumonia)  Outcome: Ongoing (interventions implemented as appropriate)    04/03/17 1523   Pneumonia   Problems Assessed (Pneumonia) all   Problems Present (Pneumonia) progression of infection;respiratory compromise

## 2017-04-03 NOTE — ED NOTES
Recently treated for PNA, finished levaquin, continues with thick stringy mucous     Beatriz' ARA Raymond  04/03/17 0032

## 2017-04-04 NOTE — PROGRESS NOTES
Continued Stay Note  [facility]     Patient Name: Britney Ellison  MRN: [id number]  Today's Date: 4/4/2017    Admit Date: 4/3/2017          Discharge Plan       04/04/17 1129    Case Management/Social Work Plan    Plan Attempted to screen but patient is off the unit for testing.    Patient/Family In Agreement With Plan other (see comments)              Discharge Codes     None            Rupa Sinha RN

## 2017-04-04 NOTE — PROGRESS NOTES
"[facility] PULMONARY CARE         Dr Horne Sayied   LOS: 1 day   Patient Care Team:  Khanh Chaudhary MD as PCP - General (Internal Medicine)  Christine Calderon MD as Consulting Physician (Hematology and Oncology)    Chief Complaint: Left multi lobar atelectasis with consolidation    Interval History: Feels better today.  Coughing up purulent sputum.  No fever chills.    REVIEW OF SYSTEMS:   CARDIOVASCULAR: No chest pain, chest pressure or chest discomfort. No palpitations or edema.   RESPIRATORY: Baseline shortness of breath.  GASTROINTESTINAL: No anorexia, nausea, vomiting or diarrhea. No abdominal pain or blood.   HEMATOLOGIC: No bleeding or bruising.     Ventilator/Non-Invasive Ventilation Settings     None            Vital Signs  Temp:  [97.8 °F (36.6 °C)-98.6 °F (37 °C)] 98.6 °F (37 °C)  Heart Rate:  [57-65] 59  Resp:  [16-22] 20  BP: (124-171)/(53-66) 171/66    Intake/Output Summary (Last 24 hours) at 04/04/17 1247  Last data filed at 04/04/17 1230   Gross per 24 hour   Intake          3476.21 ml   Output              650 ml   Net          2826.21 ml     Flowsheet Rows         First Filed Value    Admission Height  64\" (162.6 cm) Documented at 04/03/2017 0032    Admission Weight  280 lb (127 kg) Documented at 04/03/2017 0032          Physical Exam:   General Appearance:    Alert, cooperative, in no acute distress   Lungs:     diminished breath sounds rhonchi crackles on the left base.      Heart:    Regular rhythm and normal rate, normal S1 and S2, no            murmur, no gallop, no rub, no click   Chest Wall:    No abnormalities observed   Abdomen:     Normal bowel sounds, no masses, no organomegaly, soft        non-tender, non-distended, no guarding, no rebound                tenderness   Extremities:   Moves all extremities well, trace edema, no cyanosis, no             redness     Results Review:          Results from last 7 days  Lab Units 04/03/17  0158   SODIUM mmol/L 146*   POTASSIUM " mmol/L 3.7   CHLORIDE mmol/L 106   TOTAL CO2 mmol/L 29.6*   BUN mg/dL 21   CREATININE mg/dL 1.20*   GLUCOSE mg/dL 136*   CALCIUM mg/dL 8.5*       Results from last 7 days  Lab Units 04/03/17  0158   TROPONIN T ng/mL <0.010       Results from last 7 days  Lab Units 04/03/17  0158   WBC 10*3/mm3 5.64   HEMOGLOBIN g/dL 11.5*   HEMATOCRIT % 36.6   PLATELETS 10*3/mm3 162       Results from last 7 days  Lab Units 04/04/17  0410   INR  1.11*   APTT seconds 32.7                       I reviewed the patient's new clinical results.  I personally viewed and interpreted the patient's CXR        Medication Review:     bimatoprost 1 drop Both Eyes Nightly   carvedilol 3.125 mg Oral BID   dorzolamide-timolol 1 drop Both Eyes BID   heparin (porcine) 5,000 Units Subcutaneous Q8H   ipratropium-albuterol 3 mL Nebulization Q6H While Awake - RT   levETIRAcetam 500 mg Oral Q12H   levothyroxine 75 mcg Oral Q AM   nystatin  Topical Q8H   sodium chloride 4 mL Nebulization BID - RT         lactated ringers 30 mL/hr Last Rate: 30 mL/hr (04/04/17 1153)   sodium chloride 100 mL/hr Last Rate: 100 mL/hr (04/04/17 0450)       ASSESSMENT:   Left multi lobar atelectasis  Acute hypoxemic respiratory failure  Left lower lobe consolidation/ pneumonia  COPD  Hyponatremia  History of breast cancer      PLAN:  Bronchoscopy to rule out endobronchial lesion and removal of mucous plug  Continue antibiotics  De-escalate antibiotics based on cultures  Aggressive pulmonate toilet  Monitor electrolytes  Bronchodilators  Ambulate          Coleen Ramos MD  04/04/17  12:47 PM

## 2017-04-04 NOTE — PLAN OF CARE
Problem: Bronchoscopy (Adult)  Intervention: Monitor/Manage Procedure Recovery    04/04/17 1143   Respiratory Interventions   Airway/Ventilation Management airway patency maintained   Coping/Psychosocial Interventions   Environmental Support calm environment promoted   Activity   Activity Type activity adjusted per tolerance   Cardiac Interventions   Warming Thermoregulation Maintenance warm blankets applied         Goal: Signs and Symptoms of Listed Potential Problems Will be Absent or Manageable (Bronchoscopy)  Outcome: Ongoing (interventions implemented as appropriate)    04/04/17 1143   Bronchoscopy   Problems Assessed (Bronchoscopy) pain   Problems Present (Bronchoscopy) none

## 2017-04-04 NOTE — PLAN OF CARE
Problem: Fall Risk (Adult)  Goal: Identify Related Risk Factors and Signs and Symptoms  Outcome: Ongoing (interventions implemented as appropriate)    Problem: Pain, Chronic (Adult)  Goal: Identify Related Risk Factors and Signs and Symptoms  Outcome: Ongoing (interventions implemented as appropriate)    Problem: Pain, Acute (Adult)  Goal: Identify Related Risk Factors and Signs and Symptoms  Outcome: Ongoing (interventions implemented as appropriate)    Problem: Pneumonia (Adult)  Goal: Signs and Symptoms of Listed Potential Problems Will be Absent or Manageable (Pneumonia)  Outcome: Ongoing (interventions implemented as appropriate)    Problem: Patient Care Overview (Adult)  Goal: Plan of Care Review  Outcome: Ongoing (interventions implemented as appropriate)    04/04/17 0012   Coping/Psychosocial Response Interventions   Plan Of Care Reviewed With patient   Patient Care Overview   Progress no change   Outcome Evaluation   Outcome Summary/Follow up Plan npo for broncoscope no complaints of pain at this time no soa oxygen at 3 liters wheesing noted right lung         Problem: Respiratory Insufficiency (Adult)  Goal: Identify Related Risk Factors and Signs and Symptoms  Outcome: Ongoing (interventions implemented as appropriate)    Problem: Skin Integrity Impairment, Risk/Actual (Adult)  Goal: Identify Related Risk Factors and Signs and Symptoms  Outcome: Ongoing (interventions implemented as appropriate)

## 2017-04-04 NOTE — ANESTHESIA PROCEDURE NOTES
Airway  Date/Time: 4/4/2017 12:21 PM  End Time:4/4/2017 12:21 PM  Airway not difficult    General Information and Staff    Patient location during procedure: OR  Anesthesiologist: SADIE SINCLAIR    Indications and Patient Condition  Indications for airway management: airway protection    Preoxygenated: yes  MILS maintained throughout  Mask difficulty assessment: 1 - vent by mask    Final Airway Details  Final airway type: supraglottic airway      Successful airway: classic  Size 4  Cuff Pressure (cm H2O): 5  Airway Seal Pressure (cm H2O): 5    Number of attempts at approach: 1

## 2017-04-04 NOTE — ANESTHESIA PREPROCEDURE EVALUATION
Anesthesia Evaluation     Patient summary reviewed and Nursing notes reviewed   no history of anesthetic complications:  NPO Status: > 8 hours   Airway   Mallampati: II  TM distance: >3 FB  Neck ROM: full  no difficulty expected  Dental - normal exam     Pulmonary - normal exam   (+) pulmonary embolism, a smoker, COPD, sleep apnea,   Cardiovascular - normal exam    (+) hypertension, valvular problems/murmurs, CHF,       Neuro/Psych  (+) seizures, numbness,    GI/Hepatic/Renal/Endo    (+) obesity, morbid obesity, liver disease, chronic renal disease, hypothyroidism,     Musculoskeletal     Abdominal  - normal exam   Substance History      OB/GYN          Other   (+) arthritis                                 Anesthesia Plan    ASA 4     MAC     intravenous induction   Anesthetic plan and risks discussed with patient.

## 2017-04-04 NOTE — PLAN OF CARE
Problem: Fall Risk (Adult)  Goal: Identify Related Risk Factors and Signs and Symptoms  Outcome: Ongoing (interventions implemented as appropriate)    04/04/17 1459   Fall Risk   Fall Risk: Related Risk Factors gait/mobility problems;environment unfamiliar;sensory deficits       Goal: Absence of Falls  Outcome: Ongoing (interventions implemented as appropriate)    Problem: Pain, Chronic (Adult)  Goal: Acceptable Pain Control/Comfort Level  Outcome: Ongoing (interventions implemented as appropriate)    04/04/17 1459   Pain, Chronic (Adult)   Acceptable Pain Control/Comfort Level making progress toward outcome         Problem: Pain, Acute (Adult)  Goal: Acceptable Pain Control/Comfort Level  Outcome: Ongoing (interventions implemented as appropriate)    04/04/17 1459   Pain, Acute (Adult)   Acceptable Pain Control/Comfort Level making progress toward outcome         Problem: Pneumonia (Adult)  Goal: Signs and Symptoms of Listed Potential Problems Will be Absent or Manageable (Pneumonia)  Outcome: Ongoing (interventions implemented as appropriate)    04/04/17 1459   Pneumonia   Problems Assessed (Pneumonia) all   Problems Present (Pneumonia) respiratory compromise         Problem: Patient Care Overview (Adult)  Goal: Plan of Care Review  Outcome: Ongoing (interventions implemented as appropriate)    04/04/17 1459   Coping/Psychosocial Response Interventions   Plan Of Care Reviewed With patient   Patient Care Overview   Progress improving   Outcome Evaluation   Outcome Summary/Follow up Plan Bronch today, patient tolerated well, no changes in vital signs. Pain in chest reported with coughing. Antibx tx started now, lungs sound better. Will cont to monitor.         Problem: Respiratory Insufficiency (Adult)  Goal: Identify Related Risk Factors and Signs and Symptoms  Outcome: Ongoing (interventions implemented as appropriate)    04/04/17 1459   Respiratory Insufficiency   Related Risk Factors (Respiratory Insufficiency)  obesity;bedrest;activity intolerance   Signs and Symptoms (Respiratory Insufficiency) abnormal breath sounds;shortness of breath;cough (productive/ineffective);decreased oxygen saturation       Goal: Acid/Base Balance  Outcome: Ongoing (interventions implemented as appropriate)    04/04/17 1459   Respiratory Insufficiency (Adult)   Acid/Base Balance making progress toward outcome         Problem: Skin Integrity Impairment, Risk/Actual (Adult)  Goal: Identify Related Risk Factors and Signs and Symptoms  Outcome: Ongoing (interventions implemented as appropriate)    04/04/17 1459   Skin Integrity Impairment, Risk/Actual   Skin Integrity Impairment, Risk/Actual: Related Risk Factors immobility;moisture   Signs and Symptoms (Skin Integrity Impairment) inflammation       Goal: Skin Integrity/Wound Healing  Outcome: Ongoing (interventions implemented as appropriate)    04/04/17 1459   Skin Integrity Impairment, Risk/Actual (Adult)   Skin Integrity/Wound Healing making progress toward outcome

## 2017-04-04 NOTE — ANESTHESIA POSTPROCEDURE EVALUATION
Patient: Britney Ellison    Procedure Summary     Date Anesthesia Start Anesthesia Stop Room / Location    04/04/17 1213 1250  [facility] /  [facility]       Procedure Diagnosis Surgeon Provider    BRONCHOSCOPY with BAL of left lower lobe (Bilateral Bronchus) Pleural effusion, left  (Pleural effusion, left [J90]) MD Dung Ronquillo MD          Anesthesia Type: MAC  Last vitals  /66 (04/04/17 1154)    Temp      Pulse 59 (04/04/17 1154)   Resp 20 (04/04/17 1154)    SpO2 93 % (04/04/17 1154)      Post Anesthesia Care and Evaluation    Patient location during evaluation: PACU  Patient participation: complete - patient participated  Level of consciousness: awake and alert  Pain management: adequate  Airway patency: patent  Anesthetic complications: No anesthetic complications    Cardiovascular status: acceptable  Respiratory status: acceptable  Hydration status: acceptable

## 2017-04-04 NOTE — OP NOTE
Bronchoscopy Procedure Note    Procedure:  1. Bronchoscopy, Diagnostic  2. Bronchoscopy, Therapeutic  3. Bronchoalveolar lavage, BAL    Pre-Operative Diagnosis:    Post-Operative Diagnosis: Same    Indication:    Anesthesia: Monitored Anesthesia Care (MAC)    Procedure Details: Patient was consented for the procedure with all risk and benefit of the procedure explained in detail.  Patient was given the opportunity to ask questions and all concerns were answered.  The bronchocope was inserted into the main airway via the LMA. An anatomical survey was done of the main airways and the subsegmental bronchus.  The findings are reported above.  A bronchialalveolar lavage was performed using aliquots of normal saline instilled into the lower lobe airways then aspirated back.    Findings: Mucus plugging noted with no endobronchial lesion noted.    Estimated Blood Loss:  Minimal           Specimens:  Sent purulent fluid                Complications:  None; patient tolerated the procedure well.           Disposition: PACU - hemodynamically stable.      Patient tolerated the procedure well.    Coleen Ramos MD  4/4/2017  12:53 PM

## 2017-04-04 NOTE — PLAN OF CARE
Problem: Patient Care Overview (Adult)  Goal: Plan of Care Review  Outcome: Ongoing (interventions implemented as appropriate)    04/04/17 1447   Coping/Psychosocial Response Interventions   Plan Of Care Reviewed With patient   Outcome Evaluation   Outcome Summary/Follow up Plan Pt presents with impaired functional mobility and gait secondary to generalized weakness, pain, and decreased activity tolerance s/p pleural effusion. Pt may beneft from acute care PT to address these deficits and to continue working on transfers and strengthing so patient can continue to progress toward HHPT goals.         Problem: Inpatient Physical Therapy  Goal: Bed Mobility Goal LTG- PT  Outcome: Ongoing (interventions implemented as appropriate)    04/04/17 1447   Bed Mobility PT LTG   Bed Mobility PT LTG, Time to Achieve 1 wk   Bed Mobility PT LTG, Activity Type all bed mobility   Bed Mobility PT LTG, [place] Level minimum assist (75% patient effort)       Goal: Transfer Training Goal 1 LTG- PT  Outcome: Ongoing (interventions implemented as appropriate)    04/04/17 1447   Transfer Training PT LTG   Transfer Training PT LTG, Time to Achieve 1 wk   Transfer Training PT LTG, Activity Type sit to stand/stand to sit   Transfer Training PT LTG, [place] Level moderate assist (50% patient effort);2 person assist required   Transfer Training PT LTG, Assist Device walker, rolling       Goal: Transfer Training Goal 2 LTG- PT  Outcome: Ongoing (interventions implemented as appropriate)    04/04/17 1447   Transfer Training 2 PT LTG   Transfer Training PT 2 LTG, Time to Achieve 1 wk   Transfer Training PT 2 LTG, Activity Type bed to chair /chair to bed   Transfer Training PT 2 LTG, [place] Level moderate assist (50% patient effort);2 person assist required   Transfer Training PT 2 LTG, Assist Device walker, rolling

## 2017-04-04 NOTE — PROGRESS NOTES
Acute Care - Physical Therapy Initial Evaluation  [facility]     Patient Name: Britney Ellison  : 1945  MRN: [id number]  Today's Date: 2017   Onset of Illness/Injury or Date of Surgery Date: 17            Admit Date: 4/3/2017     Visit Dx:    ICD-10-CM ICD-9-CM   1. Pleural effusion, left J90 511.9   2. Atelectasis J98.11 518.0   3. Generalized weakness R53.1 780.79     Patient Active Problem List   Diagnosis   • Congestive heart failure   • Coagulation factor deficiency syndrome   • Gastrointestinal ulcer due to Helicobacter pylori   • Hyperlipidemia   • Hypertension   • Hypokalemia   • Hypothyroidism   • Meningioma   • Meralgia paresthetica   • Chronic obstructive pulmonary disease with acute exacerbation   • Impaired glucose tolerance   • Cobalamin deficiency   • Vitamin D deficiency   • Follow-up examination, following other surgery   • Midline low back pain without sciatica   • Chronic pain   • DDD (degenerative disc disease), lumbar   • Lumbar radiculopathy   • Long term current use of opiate analgesic   • History of discitis   • Malignant neoplasm of upper-outer quadrant of right female breast   • Pleural effusion, left     Past Medical History:   Diagnosis Date   • Acute myocardial infarction 2014   • Anemia     Chronic disease   • CHF (congestive heart failure)    • Chronic kidney disease     Acute on chronic, requiring an episode of hemodialysis in 2015 and 2015   • Chronic obstructive pulmonary disease    • Cirrhosis     Based on imaging   • Dialysis patient    • H/O Altered mental status 10/29/2015    and seizure   • H/O Musculoskeletal pain    • H/O Pulmonary embolism     Diagnosed in , on chronic anticoagulation; no known thrombophilia disorder   • History of being hospitalized     At the end of 10/2015 with respiratory failure requiring ventilation with congestive heart failure, chronic back spasms, intracranial hemorhage, and Proteus urinary tract infection.   •  History of chronic pain     Following with Pain Management   • History of intracranial hemorrhage 10/2015   • History of respiratory failure     Required intubation   • Hyperlipidemia    • Hypertension    • Hypothyroidism    • Immobility syndrome    • Low back pain    • Lymphedema     Right-sided   • Malignant neoplasm of breast     Invasive ductal carcinoma breast, ER/MN positive, HER2/hima over amplified.   • Morbid obesity    • Obstructive sleep apnea     Noncompliant with CPAp   • Peripheral neuropathy    • Seizures 10/2015    H/O seizure activity and a small intracranial hemorrhage.   • Traumatic subdural hematoma 08/2015     Past Surgical History:   Procedure Laterality Date   • BACK SURGERY  01/13/2016    I and D of thoracic wound infection-Dr. Esparza    • BACK SURGERY  12/24/2015    T11-T12 laminectomy fro bilateral transpedicular discectomy and I and D of T-11-T12 -Dr. Esparza   • BREAST BIOPSY Right     Of thickened area on the right breast; an FNA was performed initially that showed rare cluster of atypical epithelial cells.   • BREAST BIOPSY  10/02/2015    Punch biopsy by Dr. Haque was benign   • BREAST LUMPECTOMY Right 05/2015   • BREAST LUMPECTOMY WITH SENTINEL NODE BIOPSY  03/04/2015   • BRONCHOSCOPY Bilateral 4/4/2017    Procedure: BRONCHOSCOPY with BAL of left lower lobe;  Surgeon: Coleen Ramos MD;  Location: [facility] ENDOSCOPY;  Service:    • CHOLECYSTECTOMY     • HYSTERECTOMY     • TONSILLECTOMY            PT ASSESSMENT (last 72 hours)      PT Evaluation       04/04/17 1430 04/03/17 1000    Rehab Evaluation    Document Type evaluation  -CH     Subjective Information agree to therapy  -CH     Patient Effort, Rehab Treatment good  -CH     Symptoms Noted During/After Treatment none  -CH     General Information    Onset of Illness/Injury or Date of Surgery Date 04/03/17  -CH     General Observations supine in bed, no acute distress notd at rest  -CH     Pertinent History Of Current Problem Pt  admitted with SOA, L Pleural effusion and atelectasis after recent PNA, POD 0 Bronchoscopy  -     Precautions/Limitations fall precautions  -     Prior Level of Function mod assist:;transfer;w/c or scooter;bed mobility   pt reports she has been working with HHPT on transfers  -     Equipment Currently Used at Home lift device;wheelchair   pt reports she uses lift but is working on transfers with PT  - walker, rolling  -RK    Plans/Goals Discussed With patient  -     Barriers to Rehab medically complex;previous functional deficit  -     Clinical Impression    Patient/Family Goals Statement to be able to transfer to   -     Criteria for Skilled Therapeutic Interventions Met treatment indicated  -     Impairments Found (describe specific impairments) gait, locomotion, and balance;muscle performance  -     Rehab Potential good, to achieve stated therapy goals  -     Pain Assessment    Pain Assessment --   pt reports back pain but did not give a number  -     Cognitive Assessment/Intervention    Current Cognitive/Communication Assessment functional  -     Orientation Status oriented x 4  -     Follows Commands/Answers Questions 100% of the time  -     Personal Safety WNL/WFL  -     Personal Safety Interventions fall prevention program maintained;nonskid shoes/slippers when out of bed  -     ROM (Range of Motion)    General ROM no range of motion deficits identified  -     MMT (Manual Muscle Testing)    General MMT Assessment other (see comments)   genealized weakness noted with functional mobility  -     Bed Mobility, Assessment/Treatment    Bed Mob, Supine to Sit, [place] verbal cues required;nonverbal cues required (demo/gesture);maximum assist (25% patient effort)  -     Bed Mob, Sit to Supine, [place] verbal cues required;nonverbal cues required (demo/gesture);moderate assist (50% patient effort)  -     Transfer Assessment/Treatment    Transfers, Sit-Stand  [place] not tested   Pt reports she has not stood yet with HHPT  -     Motor Skills/Interventions    Additional Documentation Balance Skills Training (Group)  -     Balance Skills Training    Sitting-Level of Assistance Contact guard  -     Positioning and Restraints    Pre-Treatment Position in bed  -     Post Treatment Position bed  -     In Bed supine;call light within reach;encouraged to call for assist;with nsg;with family/caregiver  -       04/03/17 0648       Living Environment    Lives With child(vibha), adult  -     Living Arrangements apartment  -TW     Home Accessibility no concerns  -TW     Stair Railings at Home none  -TW     Type of Financial/Environmental Concern none  -TW     Transportation Available none  -TW       User Key  (r) = Recorded By, (t) = Taken By, (c) = Cosigned By    Initials Name Provider Type     Alana Doe PT Physical Therapist    YVONNE Wheat, RN Registered Nurse    TW Erika Valencia RN Registered Nurse          Physical Therapy Education     Title: PT OT SLP Therapies (Active)     Topic: Physical Therapy (Active)     Point: Mobility training (Done)    Learning Progress Summary    Learner Readiness Method Response Comment Documented by Status   Patient Acceptance OTTO ARMENDARIZ D VU,NR   04/04/17 1446 Done               Point: Body mechanics (Done)    Learning Progress Summary    Learner Readiness Method Response Comment Documented by Status   Patient Acceptance OTTO ARMENDARIZ D VU,NR   04/04/17 1446 Done               Point: Precautions (Done)    Learning Progress Summary    Learner Readiness Method Response Comment Documented by Status   Patient Acceptance OTTO ARMENDARIZ D VU,NR   04/04/17 1446 Done                      User Key     Initials Effective Dates Name Provider Type [facility] 12/01/15 -  Alana Doe PT Physical Therapist PT                PT Recommendation and Plan  Anticipated Discharge Disposition: home with home health  Planned Therapy  Interventions: balance training, bed mobility training, home exercise program, patient/family education, transfer training  PT Frequency: daily  Plan of Care Review  Plan Of Care Reviewed With: patient  Outcome Summary/Follow up Plan: Pt presents with impaired functional mobility and gait secondary to generalized weakness, pain, and decreased activity tolerance s/p pleural effusion. Pt may beneft from acute care PT to address these deficits and to continue working on transfers and strengthing so patient can continue to progress toward HHPT goals.          IP PT Goals       04/04/17 1447          Bed Mobility PT LTG    Bed Mobility PT LTG, Time to Achieve 1 wk  -CH      Bed Mobility PT LTG, Activity Type all bed mobility  -CH      Bed Mobility PT LTG, [place] Level minimum assist (75% patient effort)  -CH      Transfer Training PT LTG    Transfer Training PT LTG, Time to Achieve 1 wk  -CH      Transfer Training PT LTG, Activity Type sit to stand/stand to sit  -CH      Transfer Training PT LTG, [place] Level moderate assist (50% patient effort);2 person assist required  -CH      Transfer Training PT LTG, Assist Device walker, rolling  -CH      Transfer Training 2 PT LTG    Transfer Training PT 2 LTG, Time to Achieve 1 wk  -CH      Transfer Training PT 2 LTG, Activity Type bed to chair /chair to bed  -CH      Transfer Training PT 2 LTG, [place] Level moderate assist (50% patient effort);2 person assist required  -CH      Transfer Training PT 2 LTG, Assist Device walker, rolling  -CH        User Key  (r) = Recorded By, (t) = Taken By, (c) = Cosigned By    Initials Name Provider Type    FRANCISCO Doe, PT Physical Therapist                Outcome Measures       04/04/17 1400          How much help from another person do you currently need...    Turning from your back to your side while in flat bed without using bedrails? 2  -CH      Moving from lying on back to sitting on the side of a flat bed  without bedrails? 2  -CH      Moving to and from a bed to a chair (including a wheelchair)? 1  -CH      Standing up from a chair using your arms (e.g., wheelchair, bedside chair)? 1  -CH      Climbing 3-5 steps with a railing? 1  -CH      To walk in hospital room? 1  -CH      AM-PAC 6 Clicks Score 8  -CH      Functional Assessment    Outcome Measure Options AM-PAC 6 Clicks Basic Mobility (PT)  -        User Key  (r) = Recorded By, (t) = Taken By, (c) = Cosigned By    Initials Name Provider Type    CH Alana Doe PT Physical Therapist           Time Calculation:         PT Charges       04/04/17 1450 04/04/17 1433       Time Calculation    Start Time 1414  -      Stop Time 1430  -      Time Calculation (min) 16 min  -      PT Received On 04/04/17  -      PT - Next Appointment 04/05/17  - 04/05/17  -     PT Goal Re-Cert Due Date 04/11/17  -        User Key  (r) = Recorded By, (t) = Taken By, (c) = Cosigned By    Initials Name Provider Type     Alana Doe, PT Physical Therapist          Therapy Charges for Today     Code Description Service Date Service Provider Modifiers Qty    22630791146  PT EVAL MOD COMPLEXITY 2 4/4/2017 Alana Doe, PT GP 1    46896057859 HC PT THER PROC EA 15 MIN 4/4/2017 Alana Doe PT GP 1          PT G-Codes  Outcome Measure Options: AM-PAC 6 Clicks Basic Mobility (PT)      Alana Doe PT  4/4/2017

## 2017-04-05 NOTE — PROGRESS NOTES
Continued Stay Note  [facility]     Patient Name: Britney Mendietar  MRN: [id number]  Today's Date: 4/5/2017    Admit Date: 4/3/2017          Discharge Plan       04/05/17 1531    Case Management/Social Work Plan    Additional Comments Patient's oxygen is provided by [employer]       04/05/17 1513    Case Management/Social Work Plan    Plan Contacted  daughterKatie. .     Additional Comments Contacted patient's daughter, Katie. She states the best number to reach her  is [phone]. She updated her address at [address] [place] [address]  [facility] [address] [id number]. She confirmed that patient will go to Katie's house on discharge and will need ambulance transport. Disclaimer provided. . She states patient can not be transported by car. CCP to follow and will assist with transfer when indicated. Patient and daughter confirm that they want [facility] to follow at discharge......JAYLA Rivera      04/05/17 1237    Case Management/Social Work Plan    Plan Patient plans to go home on discharge. States her daughterKatie cares for her.     Additional Comments IMM given on 4-4. Patient lives with her daughterKatie in an apartment. She plans  to return to her daughter's home on discharge. She is unsure of her daughter's address but states  it's off  [address]  She has Home oxygen but does not know who supplies it. She is current with [facility].. She has a Wheel chair, a elizabet lift and a hospital bed at home. She has been to rehab in the past but does not plan to return to rehab at discharge.  ( [facility] [place] [facility])  Tied calling daughter to discuss but was unable to reach her. CCP to follow.....  JAYLA Rivera               Discharge Codes     None            JAYLA Rivera

## 2017-04-05 NOTE — PLAN OF CARE
Problem: Fall Risk (Adult)  Goal: Identify Related Risk Factors and Signs and Symptoms  Outcome: Ongoing (interventions implemented as appropriate)    Problem: Pain, Chronic (Adult)  Goal: Identify Related Risk Factors and Signs and Symptoms  Outcome: Ongoing (interventions implemented as appropriate)    Problem: Pain, Acute (Adult)  Goal: Identify Related Risk Factors and Signs and Symptoms  Outcome: Ongoing (interventions implemented as appropriate)    Problem: Pneumonia (Adult)  Goal: Signs and Symptoms of Listed Potential Problems Will be Absent or Manageable (Pneumonia)  Outcome: Ongoing (interventions implemented as appropriate)    Problem: Patient Care Overview (Adult)  Goal: Plan of Care Review  Outcome: Ongoing (interventions implemented as appropriate)    04/05/17 0433   Coping/Psychosocial Response Interventions   Plan Of Care Reviewed With patient   Patient Care Overview   Progress improving   Outcome Evaluation   Outcome Summary/Follow up Plan o2 sats beter states is breathing better cough productive of bloody sputum         Problem: Skin Integrity Impairment, Risk/Actual (Adult)  Goal: Identify Related Risk Factors and Signs and Symptoms  Outcome: Ongoing (interventions implemented as appropriate)

## 2017-04-05 NOTE — PROGRESS NOTES
Discharge Planning Assessment  [facility]     Patient Name: Britney Ellison  MRN: [id number]  Today's Date: 4/5/2017    Admit Date: 4/3/2017          Discharge Needs Assessment       04/05/17 1235    Living Environment    Lives With child(vibha), adult   Lives with daughter, Katie who takes care of her    Living Arrangements apartment   Patient unsure of address    Discharge Needs Assessment    Equipment Currently Used at Home wheelchair;lift device;hospital bed            Discharge Plan       04/05/17 1237    Case Management/Social Work Plan    Plan Patient plans to go home on discharge. States her daughter, Katie cares for her.     Additional Comments IMM given on 4-4. Patient lives with her daughter, Katie in an apartment. She plans  to return to her daughter's home on discharge. She is unsure of her daughter's address but states  it's off  [address]  She has Home oxygen but does not know who supplies it. She is current with [facility].. She has a Wheel chair, a elizabet lift and a hospital bed at home. She has been to rehab in the past but does not plan to return to rehab at discharge.  ( [facility] [place] [facility])  Tied calling daughter to discuss but was unable to reach her. CCP to follow.....  JAYLA Rivera         Discharge Placement     No information found                Demographic Summary       04/05/17 1233    Referral Information    Admission Type inpatient    Arrived From home or self-care    Referral Source admission list    Reason For Consult discharge planning    Primary Care Physician Information    Name MD 2 U            Functional Status       04/05/17 1234    Functional Status Current    Ambulation 4-->completely dependent    Transferring 4-->completely dependent    Toileting 4-->completely dependent    Bathing 4-->completely dependent    Dressing 4-->completely dependent    Eating 0-->independent    Communication 0-->understands/communicates without  difficulty    Functional Status Prior    Ambulation 4-->completely dependent    Transferring 4-->completely dependent    Toileting 4-->completely dependent    Bathing 4-->completely dependent    Dressing 3-->assistive equipment and person    Eating 0-->independent    Cognitive/Perceptual/Developmental    Developmental Stage (Eriksson's Stages of Development) Stage 8 (65 years-death/Late Adulthood) Integrity vs. Despair            Psychosocial     None            Abuse/Neglect     None            Legal     None            Substance Abuse     None            Patient Forms     None          JAYLA Rivera

## 2017-04-05 NOTE — PROGRESS NOTES
"  PROGRESS NOTE   LOS: 3 days   Patient Care Team:  Khanh Chaudhary MD as PCP - General (Internal Medicine)  Christine Calderon MD as Consulting Physician (Hematology and Oncology)    Chief Complaint: Shortness of breath, cough and chest pain    Interval History: Patient was admitted on 4/3/17 and had a CT of the chest that showed left lower lobe consolidation thought to be atelectasis versus pneumonia with possible left bronchial obstruction due to mucous plugging versus tumor.  A bronchoscopy on 4/4/17 with mucous plug removal and there is no sign of lesions.  She was treated with Rocephin and azithromycin.    He did have some hyponatremia that improved with IV fluids.     REVIEW OF SYSTEMS:   CARDIOVASCULAR:  No palpitations or edema.  Reports some chest tightness and sharp chest pains on the left eye.  RESPIRATORY: Mild shortness of breath worsened with minimal exertion.  Cough with productive thick yellow secretions that are blood-tinged  GASTROINTESTINAL: No anorexia, nausea, vomiting or diarrhea. No abdominal pain or blood.   HEMATOLOGIC: No bleeding or bruising.     Ventilator/Non-Invasive Ventilation Settings     None            Vital Signs  Temp:  [98.2 °F (36.8 °C)-98.8 °F (37.1 °C)] 98.4 °F (36.9 °C)  Heart Rate:  [58-72] 61  Resp:  [16-20] 20  BP: (124-185)/(47-74) 144/58  SpO2:  [90 %-96 %] 96 %  on  Flow (L/min):  [2] 2 O2 Device: nasal cannula with humidification    Intake/Output Summary (Last 24 hours) at 04/06/17 0834  Last data filed at 04/05/17 2214   Gross per 24 hour   Intake             2080 ml   Output             1050 ml   Net             1030 ml     Flowsheet Rows         First Filed Value    Admission Height  64\" (162.6 cm) Documented at 04/03/2017 0032    Admission Weight  280 lb (127 kg) Documented at 04/03/2017 0032        Last 3 weights    04/03/17  0032   Weight: 280 lb (127 kg)       Physical Exam:  GEN:  No acute distress, alert, cooperative, well developed obese on nasal " cannula  EYES:   Sclera clear. No icterus. PERRL. Normal EOM  ENT:   External ears/nose normal, no oral lesions, no thrush, mucous membranes moist  NECK:  Supple, midline trachea, no JVD  LUNGS: Normal chest on inspection, diminished bilateral lower lobe no wheezes.  Positive rhonchi anteriorly improved with cough No crackles. Respirations regular, even and unlabored.   CV:  Regular rhythm and rate. Normal S1/S2. No murmurs, gallops, or rubs noted.  ABD:  Soft, non-tender and non-distended. Normal bowel sounds. No guarding  EXT:  Moves all extremities well. No cyanosis. No redness. No edema.   Skin: dry, intact, no bleeding    Results Review:        Results from last 7 days  Lab Units 04/05/17  0509 04/03/17  0158   SODIUM mmol/L 142 146*   POTASSIUM mmol/L 3.9 3.7   CHLORIDE mmol/L 106 106   TOTAL CO2 mmol/L 26.0 29.6*   BUN mg/dL 19 21   CREATININE mg/dL 1.02* 1.20*   CALCIUM mg/dL 8.4* 8.5*   AST (SGOT) U/L  --  28   ALT (SGPT) U/L  --  12       Results from last 7 days  Lab Units 04/03/17  0158   TROPONIN T ng/mL <0.010       Results from last 7 days  Lab Units 04/03/17  0158   WBC 10*3/mm3 5.64   HEMOGLOBIN g/dL 11.5*   HEMATOCRIT % 36.6   PLATELETS 10*3/mm3 162   NEUTROPHIL % % 59.5   LYMPHOCYTE % % 27.3   MONOCYTES % % 6.9   EOSINOPHIL % % 5.9   BASOPHIL % % 0.4   IMM GRAN % % 0.0       Results from last 7 days  Lab Units 04/04/17  0410   INR  1.11*   APTT seconds 32.7                     No results found for: POCGLU    Results from last 7 days  Lab Units 04/03/17  0158   PROCALCITONIN ng/mL 0.58*       Results from last 7 days  Lab Units 04/03/17  0158   PROBNP pg/mL 913.1*       Results from last 7 days  Lab Units 04/04/17  1233   GRAM STAIN RESULT  Few (2+) WBCs seen  Few (2+) Epithelial cells per low power field  No organisms seen                 Imaging Results (all)     Procedure Component Value Units Date/Time    CT Chest Without Contrast [[id number]] Collected:  04/03/17 0301     Updated:  04/03/17  0315    Narrative:       CT SCAN OF THE CHEST WITHOUT CONTRAST     HISTORY: Shortness of breath. Has extensive atelectasis and pneumonia in  the left chest noted on recent chest x-ray.     The CT scan was performed as an emergency procedure through the chest  without contrast and demonstrates the followin. The CT scan shows extensive dense atelectasis involving the entire  left upper lobe and there is also fairly extensive pneumonia scattered  throughout the left lower lobe and associated with a tiny pleural  effusion. There is marked volume loss with mediastinal shift to the  left. No definite underlying tumor mass is currently seen but continued  follow-up evaluation is recommended by chest x-ray to ensure appropriate  resolution.  2. The right lung is well-expanded and clear except for a calcified  granuloma in the right upper lobe. There is moderate generalized  cardiomegaly. No adenopathy is seen. There is no pericardial effusion.  3. There is some image degradation related to the patient's marked  obesity. The CT images through the upper liver demonstrate a vaguely  lobulated contour highly suspicious for changes of cirrhosis and this is  unchanged from previous CT scans. There is also an area of somewhat  vague low-density in the left lobe that appears to be new since the  unenhanced CT scan of 2015 as well as the enhanced CT scan of  2015. The patient has history of breast cancer and this could  potentially represent a metastatic lesion or primary liver lesion and  further evaluation with enhanced CT scan of the abdomen or MRI of the  abdomen is recommended when possible.     This report was finalized on 4/3/2017 3:12 AM by Dr. Guillermo Huizar MD.       XR Chest 2 View [[id number]] Collected:  17     Updated:  17    Narrative:       TWO-VIEW CHEST     HISTORY: Shortness of breath and cough.     FINDINGS:  There is nearly complete opacification of the left chest  with  marked volume loss and mediastinal shift to the left and the appearance  is consistent with a combination of extensive atelectasis and also  extensive pneumonia and continued follow-up evaluation is recommended.  These findings are new since the study of 01/12/2016. The right lung  remains well-expanded and clear.     This report was finalized on 4/3/2017 8:51 PM by Dr. Guillermo Huizar MD.             I reviewed the patient's new clinical results.  I personally viewed and interpreted the patient's imaging results:        Medication Review:     atorvastatin 10 mg Oral Nightly   bimatoprost 1 drop Both Eyes Nightly   budesonide 0.5 mg Nebulization BID - RT   carvedilol 3.125 mg Oral BID   ceftriaxone 1 g Intravenous Q24H   dorzolamide-timolol 1 drop Both Eyes BID   Eucerin original healing lotion  Topical Daily   fluticasone 1 spray Each Nare Daily   furosemide 40 mg Oral Daily   heparin (porcine) 5,000 Units Subcutaneous Q8H   ipratropium-albuterol 3 mL Nebulization Q6H While Awake - RT   letrozole 2.5 mg Oral Daily   levETIRAcetam 500 mg Oral Q12H   levothyroxine 75 mcg Oral Q AM   montelukast 10 mg Oral Daily   nystatin  Topical Q8H   pantoprazole 20 mg Oral BID AC   potassium chloride 20 mEq Oral Daily   sodium chloride 4 mL Nebulization BID - RT         lactated ringers 30 mL/hr Last Rate: Stopped (04/04/17 1308)       ASSESSMENT:   1. Acute hypoxemic respiratory failure  2. Left lower lobe consolidation concerning for pneumonia  3. Left multilobular atelectasis  4. Mucus plugging status post removal with bronchoscopy on 4/4/17  5. COPD  6. Hypernatremia- improved   7. History of breast cancer  8. Hypertension  9. Obstructive sleep apnea-noncompliant with CPAP  10. Morbid obesity  11. History of pulmonary embolism   12. chronic kidney disease    PLAN:  Continue IV azithromycin and Rocephin and await BAL cultures  Encourage pulmonary hygiene with TCD B  Will add flutter valve  Resumed home diuretics and  potassium  May need to add medication for hypertension if continues to be elevated  On subcutaneous heparin for DVT prophylaxis and continue to monitor hemoptysis  Check a chest x-ray in a.m. to reevaluate left-sided consolidation      Disposition: Home with oxygen.  Would like to switch back to low-level pulmonary at discharge.  She has previously seen Dr. Alvarez and Dr. Rachel Patiño MD  04/06/17  8:34 AM          EMR Dragon/Transcription disclaimer:   Much of this encounter note is an electronic transcription/translation of spoken language to printed text. The electronic translation of spoken language may permit erroneous, or at times, nonsensical words or phrases to be inadvertently transcribed; Although I have reviewed the note for such errors, some may still exist.

## 2017-04-05 NOTE — PROGRESS NOTES
[address] [facility] in [address] [facility]    Phone:  [phone]    Fax:  [address] [facility]   MRN: [id number]    Department:  [address] [facility] in [facility]   Date of [facility] EMERGENCY DEPARTMENT AT [place], [place]. \Hospital Has a New Name and Outlook.\"" Acute Care - Physical Therapy Treatment Note  [facility]     Patient Name: Britney Ellison  : 1945  MRN: [id number]  Today's Date: 2017  Onset of Illness/Injury or Date of Surgery Date: 17          Admit Date: 4/3/2017    Visit Dx:    ICD-10-CM ICD-9-CM   1. Pleural effusion, left J90 511.9   2. Atelectasis J98.11 518.0   3. Generalized weakness R53.1 780.79     Patient Active Problem List   Diagnosis   • Congestive heart failure   • Coagulation factor deficiency syndrome   • Gastrointestinal ulcer due to Helicobacter pylori   • Hyperlipidemia   • Hypertension   • Hypokalemia   • Hypothyroidism   • Meningioma   • Meralgia paresthetica   • Chronic obstructive pulmonary disease with acute exacerbation   • Impaired glucose tolerance   • Cobalamin deficiency   • Vitamin D deficiency   • Follow-up examination, following other surgery   • Midline low back pain without sciatica   • Chronic pain   • DDD (degenerative disc disease), lumbar   • Lumbar radiculopathy   • Long term current use of opiate analgesic   • History of discitis   • Malignant neoplasm of upper-outer quadrant of right female breast   • Pleural effusion, left               Adult Rehabilitation Note       17 1500          Rehab Assessment/Intervention    Discipline physical therapy assistant  -CW      Document Type therapy note (daily note)  -CW      Subjective Information agree to therapy;complains of;weakness;fatigue;pain  -CW      Patient Effort, Rehab Treatment adequate  -CW      Precautions/Limitations fall precautions  -CW      Recorded by [CW] Roberto Clay      Pain Assessment    Pain Assessment 0-10  -CW      Pain Score 3  -CW      Pain Location Chest  -CW      Pain Intervention(s) Repositioned;Ambulation/increased activity  -CW      Recorded by [CW] Roberto Clay      Cognitive Assessment/Intervention    Current Cognitive/Communication Assessment functional  -CW      Orientation Status oriented x 4  -CW       [phone]       To Check ER Wait Times:  TEXT 'ERwait' to 26902      Click [email or web address] org      Or call [phone]    If you have any problems with your follow-up, please call our  at [phone]    Tom stevensona con Follows Commands/Answers Questions 100% of the time;able to follow single-step instructions;needs cueing  -CW      Personal Safety WNL/WFL;decreased awareness, need for safety;decreased insight to deficits  -CW      Personal Safety Interventions fall prevention program maintained;gait belt;nonskid shoes/slippers when out of bed  -CW      Recorded by [RUBEN] Roberto Clay      Bed Mobility, Assessment/Treatment    Bed Mob, Supine to Sit, [place] verbal cues required;nonverbal cues required (demo/gesture);maximum assist (25% patient effort)  -CW      Bed Mob, Sit to Supine, [place] verbal cues required;nonverbal cues required (demo/gesture);moderate assist (50% patient effort)  -CW      Recorded by [RUBEN] Roberto Clay      Transfer Assessment/Treatment    Transfers, Sit-Stand Magoffin not tested;unable to perform  -CW      Recorded by [RUBEN] Roberto Clay      Gait Assessment/Treatment    Gait, Magoffin Level not tested;unable to perform  -CW      Recorded by [RUBEN] Roberto Clay      Balance Skills Training    Sitting-Level of Assistance Close supervision  -CW      Recorded by [RUBEN] Roberto Clay      Therapy Exercises    Bilateral Lower Extremities AROM:;10 reps;sitting;ankle pumps/circles;glut sets;hip flexion;LAQ  -CW      Recorded by [RUBEN] Roberto Clay      Positioning and Restraints    Pre-Treatment Position in bed  -CW      Post Treatment Position bed  -CW      In Bed notified nsg;supine;call light within reach;encouraged to call for assist  -CW      Recorded by [RUBEN] Roberto Clay        User Key  (r) = Recorded By, (t) = Taken By, (c) = Cosigned By    Initials Name Effective Dates    CW Roberto Clay 12/13/16 -                 IP PT Goals       04/04/17 1447          Bed Mobility PT LTG    Bed Mobility PT LTG, Time to Achieve 1 wk  -CH      Bed Mobility PT LTG, Activity Type all bed mobility  -CH      Bed Mobility PT LTG, Mag[place]in Level minimum assist (75%  I have read and understand the instructions given to me by my caregivers. 24-Hour Pharmacies        Pharmacy Address Phone Number   [facility] [phone] ([address]) Thu Carrillo patient effort)  -CH      Transfer Training PT LTG    Transfer Training PT LTG, Time to Achieve 1 wk  -CH      Transfer Training PT LTG, Activity Type sit to stand/stand to sit  -CH      Transfer Training PT LTG, Schuyler Level moderate assist (50% patient effort);2 person assist required  -CH      Transfer Training PT LTG, Assist Device walker, rolling  -CH      Transfer Training 2 PT LTG    Transfer Training PT 2 LTG, Time to Achieve 1 wk  -CH      Transfer Training PT 2 LTG, Activity Type bed to chair /chair to bed  -CH      Transfer Training PT 2 LTG, Schuyler Level moderate assist (50% patient effort);2 person assist required  -CH      Transfer Training PT 2 LTG, Assist Device walker, rolling  -CH        User Key  (r) = Recorded By, (t) = Taken By, (c) = Cosigned By    Initials Name Provider Type    FRANCISCO Doe, PT Physical Therapist          Physical Therapy Education     Title: PT OT SLP Therapies (Done)     Topic: Physical Therapy (Done)     Point: Mobility training (Done)    Learning Progress Summary    Learner Readiness Method Response Comment Documented by Status   Patient Acceptance E,TB DU,VU   04/05/17 1516 Done    Acceptance TB,E,D VU,NR   04/04/17 1446 Done               Point: Home exercise program (Done)    Learning Progress Summary    Learner Readiness Method Response Comment Documented by Status   Patient Acceptance E,TB DU,VU   04/05/17 1516 Done               Point: Body mechanics (Done)    Learning Progress Summary    Learner Readiness Method Response Comment Documented by Status   Patient Acceptance E,TB DU,VU  CW 04/05/17 1516 Done    Acceptance TB,E,D VU,NR   04/04/17 1446 Done               Point: Precautions (Done)    Learning Progress Summary    Learner Readiness Method Response Comment Documented by Status   Patient Acceptance E,TB DU,VU   04/05/17 1516 Done    Acceptance TB,E,D VU,NR   04/04/17 1446 Done                      User Key     Initials Effective  Dates Name Provider Type Discipline     12/01/15 -  Alana Doe, PT Physical Therapist PT    CW 12/13/16 -  Roberto Clay Physical Therapy Assistant PT                    PT Recommendation and Plan  Anticipated Discharge Disposition: home with home health  Planned Therapy Interventions: balance training, bed mobility training, home exercise program, patient/family education, transfer training  PT Frequency: daily  Plan of Care Review  Plan Of Care Reviewed With: patient  Progress: progress toward functional goals as expected  Outcome Summary/Follow up Plan: Pt states that she doesn't stand up but sat EOB and did ther ex sitting position          Outcome Measures       04/05/17 1500 04/04/17 1400       How much help from another person do you currently need...    Turning from your back to your side while in flat bed without using bedrails? 2  -CW 2  -CH     Moving from lying on back to sitting on the side of a flat bed without bedrails? 2  -CW 2  -CH     Moving to and from a bed to a chair (including a wheelchair)? 1  -CW 1  -CH     Standing up from a chair using your arms (e.g., wheelchair, bedside chair)? 1  -CW 1  -CH     Climbing 3-5 steps with a railing? 1  -CW 1  -CH     To walk in hospital room? 1  -CW 1  -CH     AM-PAC 6 Clicks Score 8  -CW 8  -CH     Functional Assessment    Outcome Measure Options AM-PAC 6 Clicks Basic Mobility (PT)  -CW AM-PAC 6 Clicks Basic Mobility (PT)  -CH       User Key  (r) = Recorded By, (t) = Taken By, (c) = Cosigned By    Initials Name Provider Type     Alana Doe, PT Physical Therapist    CW Roberto Clay Physical Therapy Assistant           Time Calculation:         PT Charges       04/05/17 1517          Time Calculation    Start Time 1500  -CW      Stop Time 1517  -CW      Time Calculation (min) 17 min  -CW      PT Received On 04/05/17  -CW      PT - Next Appointment 04/06/17  -CW        User Key  (r) = Recorded By, (t) = Taken By, (c) = Cosigned By  view more details from this visit by going to [email or web address]. If you've recently had a stay at the Hospital you can access your discharge instructions in [facility] by going to Visits < Admission Summaries.  If you've been to the Emergency Depar    Initials Name Provider Type    CW Roberto Clay Physical Therapy Assistant          Therapy Charges for Today     Code Description Service Date Service Provider Modifiers Qty    73407935960 HC PT THER PROC EA 15 MIN 4/5/2017 Roberto Clay GP 1    62072159696 HC PT THER SUPP EA 15 MIN 4/5/2017 Roberto Clay GP 1          PT G-Codes  Outcome Measure Options: AM-PAC 6 Clicks Basic Mobility (PT)    Roberto Clay  4/5/2017

## 2017-04-05 NOTE — PLAN OF CARE
Problem: Patient Care Overview (Adult)  Goal: Plan of Care Review  Outcome: Ongoing (interventions implemented as appropriate)    04/05/17 1516   Coping/Psychosocial Response Interventions   Plan Of Care Reviewed With patient   Patient Care Overview   Progress progress toward functional goals as expected   Outcome Evaluation   Outcome Summary/Follow up Plan Pt states that she doesn't stand up but sat EOB and did ther ex sitting position

## 2017-04-05 NOTE — NURSING NOTE
"Patient states \"I have had a pressure ulcer on my heel off and on forever. My  is Alevism about keeping it up on a pillow but sometimes I forget and at times it gets worse.\" Currently, right heel is pink but blanching and has dry skin over it. I would just recommend to float heel and apply eucerin lotion to BLE/feet. Patient verbalized understanding.  "

## 2017-04-05 NOTE — PROGRESS NOTES
Continued Stay Note  [facility]     Patient Name: Britney Mendietar  MRN: [id number]  Today's Date: 4/5/2017    Admit Date: 4/3/2017          Discharge Plan       04/05/17 1513    Case Management/Social Work Plan    Plan Contacted  daughterKatie. .     Additional Comments Contacted patient's daughter, Katie. She states the best number to reach her  is [phone]. She updated her address at [address] [place], [place] [address] [address].  (face sheet updated) Daughter  confirms that patient will go to her  house on discharge and will need ambulance transport. Disclaimer provided. . Katie  states patient can not be transported by car. CCP to follow and will assist with transfer when indicated. Patient and daughter confirm that they want [facility] to follow at discharge. [facility]  Updated. ......JAYLA Rivera      04/05/17 1235    Case Management/Social Work Plan    Plan Patient plans to go home on discharge. States her daughterKatie cares for her.     Additional Comments IMM given on 4-4. Patient lives with her daughterKatie in an apartment. She plans  to return to her daughter's home on discharge. She is unsure of her daughter's address but states  it's off  [address]  She has Home oxygen but does not know who supplies it. She is current with [facility].. She has a Wheel chair, a elizabet lift and a hospital bed at home. She has been to rehab in the past but does not plan to return to rehab at discharge.  ( [facility] [address] [facility])  Tied calling daughter to discuss but was unable to reach her. CCP to follow.....  JAYLA Rivera               Discharge Codes     None            JAYLA Rivera

## 2017-04-06 NOTE — PLAN OF CARE
Problem: Patient Care Overview (Adult)  Goal: Plan of Care Review  Outcome: Ongoing (interventions implemented as appropriate)    04/06/17 3906   Coping/Psychosocial Response Interventions   Plan Of Care Reviewed With patient   Patient Care Overview   Progress progress towards functional goals is fair   Outcome Evaluation   Outcome Summary/Follow up Plan Pt increased with sitting balance and strength with doing sitting ther ex

## 2017-04-06 NOTE — PROGRESS NOTES
Acute Care - Physical Therapy Treatment Note  [facility]     Patient Name: Britney Ellison  : 1945  MRN: [id number]  Today's Date: 2017  Onset of Illness/Injury or Date of Surgery Date: 17          Admit Date: 4/3/2017    Visit Dx:    ICD-10-CM ICD-9-CM   1. Pleural effusion, left J90 511.9   2. Atelectasis J98.11 518.0   3. Generalized weakness R53.1 780.79     Patient Active Problem List   Diagnosis   • Congestive heart failure   • Coagulation factor deficiency syndrome   • Gastrointestinal ulcer due to Helicobacter pylori   • Hyperlipidemia   • Hypertension   • Hypokalemia   • Hypothyroidism   • Meningioma   • Meralgia paresthetica   • Chronic obstructive pulmonary disease with acute exacerbation   • Impaired glucose tolerance   • Cobalamin deficiency   • Vitamin D deficiency   • Follow-up examination, following other surgery   • Midline low back pain without sciatica   • Chronic pain   • DDD (degenerative disc disease), lumbar   • Lumbar radiculopathy   • Long term current use of opiate analgesic   • History of discitis   • Malignant neoplasm of upper-outer quadrant of right female breast   • Pleural effusion, left               Adult Rehabilitation Note       17 1400 17 1500       Rehab Assessment/Intervention    Discipline physical therapy assistant  -CW physical therapy assistant  -CW     Document Type therapy note (daily note)  -CW therapy note (daily note)  -CW     Subjective Information agree to therapy;complains of;weakness;fatigue;pain  -CW agree to therapy;complains of;weakness;fatigue;pain  -CW     Patient Effort, Rehab Treatment adequate  -CW adequate  -CW     Precautions/Limitations fall precautions  -CW fall precautions  -CW     Recorded by [CW] Roberto Clay [CW] Roberto Clay     Pain Assessment    Pain Assessment 0-10  -CW 0-10  -CW     Pain Score 0  -CW 3  -CW     Post Pain Score 6  -CW      Pain Location Back  -CW Chest  -CW     Pain Intervention(s)  Medication (See MAR)  -CW Repositioned;Ambulation/increased activity  -CW     Recorded by [CW] Roberto Clay [CW] Roberto Clay     Cognitive Assessment/Intervention    Current Cognitive/Communication Assessment functional  -CW functional  -CW     Orientation Status oriented x 4  -CW oriented x 4  -CW     Follows Commands/Answers Questions 100% of the time;able to follow single-step instructions;needs cueing  -% of the time;able to follow single-step instructions;needs cueing  -CW     Personal Safety WNL/WFL  -CW WNL/WFL;decreased awareness, need for safety;decreased insight to deficits  -CW     Personal Safety Interventions fall prevention program maintained;gait belt;nonskid shoes/slippers when out of bed  -CW fall prevention program maintained;gait belt;nonskid shoes/slippers when out of bed  -CW     Recorded by [CW] Roberto Clay [CW] Roberto Clay     Bed Mobility, Assessment/Treatment    Bed Mob, Supine to Sit, [place] verbal cues required;nonverbal cues required (demo/gesture);maximum assist (25% patient effort)  -CW verbal cues required;nonverbal cues required (demo/gesture);maximum assist (25% patient effort)  -CW     Bed Mob, Sit to Supine, [place] verbal cues required;nonverbal cues required (demo/gesture);moderate assist (50% patient effort)  -CW verbal cues required;nonverbal cues required (demo/gesture);moderate assist (50% patient effort)  -CW     Recorded by [CW] Roberto Clay [CW] Roberto Clay     Transfer Assessment/Treatment    Transfers, Sit-Stand [place] unable to perform;not tested  -CW not tested;unable to perform  -CW     Recorded by [CW] Roberto Clay [CW] Roberto Clay     Gait Assessment/Treatment    Gait, [place] Level not tested;unable to perform  -CW not tested;unable to perform  -CW     Recorded by [CW] Roberto Clay [CW] Roberto Clay     Balance Skills Training    Sitting-Level of Assistance  Close  supervision  -CW     Recorded by  [CW] Roberto Clay     Therapy Exercises    Bilateral Lower Extremities AROM:;10 reps;sitting;ankle pumps/circles;glut sets;hip flexion;LAQ  -CW AROM:;10 reps;sitting;ankle pumps/circles;glut sets;hip flexion;LAQ  -CW     Recorded by [CW] Roberto Clay [CW] Roberto Clay     Positioning and Restraints    Pre-Treatment Position in bed  -CW in bed  -CW     Post Treatment Position bed  -CW bed  -CW     In Bed notified nsg;supine;call light within reach;encouraged to call for assist  -CW notified nsg;supine;call light within reach;encouraged to call for assist  -CW     Recorded by [CW] Roberto Clay [CW] Roberto Clay       User Key  (r) = Recorded By, (t) = Taken By, (c) = Cosigned By    Initials Name Effective Dates    CW Roberto Clay 12/13/16 -                 IP PT Goals       04/04/17 1447          Bed Mobility PT LTG    Bed Mobility PT LTG, Time to Achieve 1 wk  -CH      Bed Mobility PT LTG, Activity Type all bed mobility  -CH      Bed Mobility PT LTG, Blanco Level minimum assist (75% patient effort)  -CH      Transfer Training PT LTG    Transfer Training PT LTG, Time to Achieve 1 wk  -CH      Transfer Training PT LTG, Activity Type sit to stand/stand to sit  -CH      Transfer Training PT LTG, Blanco Level moderate assist (50% patient effort);2 person assist required  -CH      Transfer Training PT LTG, Assist Device walker, rolling  -CH      Transfer Training 2 PT LTG    Transfer Training PT 2 LTG, Time to Achieve 1 wk  -CH      Transfer Training PT 2 LTG, Activity Type bed to chair /chair to bed  -CH      Transfer Training PT 2 LTG, Blanco Level moderate assist (50% patient effort);2 person assist required  -CH      Transfer Training PT 2 LTG, Assist Device walker, rolling  -CH        User Key  (r) = Recorded By, (t) = Taken By, (c) = Cosigned By    Initials Name Provider Type    FRANCISCO Doe, PT Physical Therapist           Physical Therapy Education     Title: PT OT SLP Therapies (Done)     Topic: Physical Therapy (Done)     Point: Mobility training (Done)    Learning Progress Summary    Learner Readiness Method Response Comment Documented by Status   Patient Acceptance TB,E DU,VU  CW 04/06/17 1459 Done    Acceptance E,TB DU,VU  CW 04/05/17 1516 Done    Acceptance TB,E,D VU,NR   04/04/17 1446 Done               Point: Home exercise program (Done)    Learning Progress Summary    Learner Readiness Method Response Comment Documented by Status   Patient Acceptance TB,E DU,VU  CW 04/06/17 1459 Done    Acceptance E,TB DU,VU  CW 04/05/17 1516 Done               Point: Body mechanics (Done)    Learning Progress Summary    Learner Readiness Method Response Comment Documented by Status   Patient Acceptance TB,E DU,VU  CW 04/06/17 1459 Done    Acceptance E,TB DU,VU  CW 04/05/17 1516 Done    Acceptance TB,E,D VU,NR   04/04/17 1446 Done               Point: Precautions (Done)    Learning Progress Summary    Learner Readiness Method Response Comment Documented by Status   Patient Acceptance TB,E DU,VU  CW 04/06/17 1459 Done    Acceptance E,TB DU,VU  CW 04/05/17 1516 Done    Acceptance TB,E,D VU,NR   04/04/17 1446 Done                      User Key     Initials Effective Dates Name Provider Type Discipline     12/01/15 -  Alana Doe, PT Physical Therapist PT     12/13/16 -  Roberto Clay Physical Therapy Assistant PT                    PT Recommendation and Plan  Anticipated Discharge Disposition: home with home health  Planned Therapy Interventions: balance training, bed mobility training, home exercise program, patient/family education, transfer training  PT Frequency: daily  Plan of Care Review  Plan Of Care Reviewed With: patient  Progress: progress towards functional goals is fair  Outcome Summary/Follow up Plan: Pt increased with sitting balance and strength with doing sitting ther ex          Outcome Measures        04/06/17 1500 04/05/17 1500 04/04/17 1400    How much help from another person do you currently need...    Turning from your back to your side while in flat bed without using bedrails? 2  -CW 2  -CW 2  -CH    Moving from lying on back to sitting on the side of a flat bed without bedrails? 2  -CW 2  -CW 2  -CH    Moving to and from a bed to a chair (including a wheelchair)? 1  -CW 1  -CW 1  -CH    Standing up from a chair using your arms (e.g., wheelchair, bedside chair)? 1  -CW 1  -CW 1  -CH    Climbing 3-5 steps with a railing? 1  -CW 1  -CW 1  -CH    To walk in hospital room? 1  -CW 1  -CW 1  -CH    AM-PAC 6 Clicks Score 8  -CW 8  -CW 8  -CH    Functional Assessment    Outcome Measure Options AM-PAC 6 Clicks Basic Mobility (PT)  -CW AM-PAC 6 Clicks Basic Mobility (PT)  -CW AM-PAC 6 Clicks Basic Mobility (PT)  -CH      User Key  (r) = Recorded By, (t) = Taken By, (c) = Cosigned By    Initials Name Provider Type    CH Alana Doe, PT Physical Therapist    CW Roberto Clay Physical Therapy Assistant           Time Calculation:         PT Charges       04/06/17 1500          Time Calculation    Start Time 1443  -CW      Stop Time 1500  -CW      Time Calculation (min) 17 min  -CW      PT Received On 04/06/17  -CW      PT - Next Appointment 04/07/17  -CW        User Key  (r) = Recorded By, (t) = Taken By, (c) = Cosigned By    Initials Name Provider Type    CW Roberto Clay Physical Therapy Assistant          Therapy Charges for Today     Code Description Service Date Service Provider Modifiers Qty    38161298794 HC PT THER PROC EA 15 MIN 4/5/2017 Roberto Clay GP 1    50206462294 HC PT THER SUPP EA 15 MIN 4/5/2017 Roberto Clay GP 1    33462444823 HC PT THER PROC EA 15 MIN 4/6/2017 Roberto Clay GP 1    72855978919 HC PT THER SUPP EA 15 MIN 4/6/2017 Roberto Clay GP 1          PT G-Codes  Outcome Measure Options: AM-PAC 6 Clicks Basic Mobility (PT)    Roberto HAHN  Obed  4/6/2017

## 2017-04-06 NOTE — PLAN OF CARE
Problem: Fall Risk (Adult)  Goal: Identify Related Risk Factors and Signs and Symptoms  Outcome: Ongoing (interventions implemented as appropriate)    Problem: Pain, Acute (Adult)  Goal: Identify Related Risk Factors and Signs and Symptoms  Outcome: Ongoing (interventions implemented as appropriate)    Problem: Pneumonia (Adult)  Goal: Signs and Symptoms of Listed Potential Problems Will be Absent or Manageable (Pneumonia)  Outcome: Ongoing (interventions implemented as appropriate)    Problem: Patient Care Overview (Adult)  Goal: Plan of Care Review  Outcome: Ongoing (interventions implemented as appropriate)    04/05/17 2201   Coping/Psychosocial Response Interventions   Plan Of Care Reviewed With patient   Patient Care Overview   Progress improving   Outcome Evaluation   Outcome Summary/Follow up Plan vitals stable, am labs, encourage turning, TCDB, O2 requirement at 2L, no c/o pain or SOA, IV antibx         Problem: Respiratory Insufficiency (Adult)  Goal: Identify Related Risk Factors and Signs and Symptoms  Outcome: Ongoing (interventions implemented as appropriate)

## 2017-04-06 NOTE — PROGRESS NOTES
"  PROGRESS NOTE   LOS: 3 days   Patient Care Team:  Khanh Chaudhary MD as PCP - General (Internal Medicine)  Christine Calderon MD as Consulting Physician (Hematology and Oncology)    Chief Complaint: Shortness of breath, cough and chest pain    Interval History: Patient was admitted on 4/3/17 and had a CT of the chest that showed left lower lobe consolidation thought to be atelectasis versus pneumonia with possible left bronchial obstruction due to mucous plugging versus tumor.  A bronchoscopy on 4/4/17 with mucous plug removal and there is no sign of lesions.  She was treated with Rocephin and azithromycin.  She had an allergic reaction to the azithromycin, which was discontinued.    On 4/6/17 she started having episodes of diarrhea.    He did have some hyponatremia that improved with IV fluids.     REVIEW OF SYSTEMS:   CARDIOVASCULAR:  No palpitations or edema.  Reports some chest tightness and sharp chest pains on the left side  RESPIRATORY: Mild shortness of breath worsened with minimal exertion.  Cough with productive thick yellow secretions  GASTROINTESTINAL: No anorexia, nausea, vomiting . No abdominal pain or blood. 4 episodes of diarrhea  HEMATOLOGIC: No bleeding or bruising.     Ventilator/Non-Invasive Ventilation Settings     None        Vital Signs  Temp:  [98.2 °F (36.8 °C)-98.8 °F (37.1 °C)] 98.8 °F (37.1 °C)  Heart Rate:  [55-72] 60  Resp:  [16-20] 20  BP: (124-166)/(46-67) 136/46  SpO2:  [90 %-96 %] 94 %  on  Flow (L/min):  [1.5-2] 2 O2 Device: nasal cannula with humidification    Intake/Output Summary (Last 24 hours) at 04/06/17 1631  Last data filed at 04/06/17 1500   Gross per 24 hour   Intake              600 ml   Output              950 ml   Net             -350 ml     Flowsheet Rows         First Filed Value    Admission Height  64\" (162.6 cm) Documented at 04/03/2017 0032    Admission Weight  280 lb (127 kg) Documented at 04/03/2017 0032        Last 3 weights    04/03/17  0032   Weight: " 280 lb (127 kg)       Physical Exam:  GEN:  No acute distress, alert, cooperative, well developed obese, on O2 per nasal cannula  EYES:   Sclera clear. No icterus. PERRL. Normal EOM  ENT:   External ears/nose normal, no oral lesions, no thrush, mucous membranes moist  NECK:  Supple, midline trachea, no JVD  LUNGS: Normal chest on inspection, diminished bilateral lower lobe L>R no wheezes. No Rhonchi.  No crackles. Respirations regular, even and unlabored.   CV:  Regular rhythm and rate. Normal S1/S2. No murmurs, gallops, or rubs noted.  ABD:  Soft, non-tender and non-distended. Normal bowel sounds. No guarding  EXT:  Moves all extremities well. No cyanosis. No redness. No edema.   Skin: dry, intact, no bleeding    Results Review:        Results from last 7 days  Lab Units 04/05/17  0509 04/03/17  0158   SODIUM mmol/L 142 146*   POTASSIUM mmol/L 3.9 3.7   CHLORIDE mmol/L 106 106   TOTAL CO2 mmol/L 26.0 29.6*   BUN mg/dL 19 21   CREATININE mg/dL 1.02* 1.20*   CALCIUM mg/dL 8.4* 8.5*   AST (SGOT) U/L  --  28   ALT (SGPT) U/L  --  12       Results from last 7 days  Lab Units 04/03/17  0158   TROPONIN T ng/mL <0.010       Results from last 7 days  Lab Units 04/03/17  0158   WBC 10*3/mm3 5.64   HEMOGLOBIN g/dL 11.5*   HEMATOCRIT % 36.6   PLATELETS 10*3/mm3 162   NEUTROPHIL % % 59.5   LYMPHOCYTE % % 27.3   MONOCYTES % % 6.9   EOSINOPHIL % % 5.9   BASOPHIL % % 0.4   IMM GRAN % % 0.0       Results from last 7 days  Lab Units 04/04/17  0410   INR  1.11*   APTT seconds 32.7                     No results found for: POCGLU    Results from last 7 days  Lab Units 04/03/17  0158   PROCALCITONIN ng/mL 0.58*       Results from last 7 days  Lab Units 04/03/17  0158   PROBNP pg/mL 913.1*       Results from last 7 days  Lab Units 04/04/17  1233   CULTURE  <25,000 CFU/mL   No Normal Respiratory Debbie*   GRAM STAIN RESULT  Few (2+) WBCs seen  Few (2+) Epithelial cells per low power field  No organisms seen                 Imaging  Results (all)     Procedure Component Value Units Date/Time    CT Chest Without Contrast [[id number]] Collected:  17 0301     Updated:  175    Narrative:       CT SCAN OF THE CHEST WITHOUT CONTRAST     HISTORY: Shortness of breath. Has extensive atelectasis and pneumonia in  the left chest noted on recent chest x-ray.     The CT scan was performed as an emergency procedure through the chest  without contrast and demonstrates the followin. The CT scan shows extensive dense atelectasis involving the entire  left upper lobe and there is also fairly extensive pneumonia scattered  throughout the left lower lobe and associated with a tiny pleural  effusion. There is marked volume loss with mediastinal shift to the  left. No definite underlying tumor mass is currently seen but continued  follow-up evaluation is recommended by chest x-ray to ensure appropriate  resolution.  2. The right lung is well-expanded and clear except for a calcified  granuloma in the right upper lobe. There is moderate generalized  cardiomegaly. No adenopathy is seen. There is no pericardial effusion.  3. There is some image degradation related to the patient's marked  obesity. The CT images through the upper liver demonstrate a vaguely  lobulated contour highly suspicious for changes of cirrhosis and this is  unchanged from previous CT scans. There is also an area of somewhat  vague low-density in the left lobe that appears to be new since the  unenhanced CT scan of 2015 as well as the enhanced CT scan of  2015. The patient has history of breast cancer and this could  potentially represent a metastatic lesion or primary liver lesion and  further evaluation with enhanced CT scan of the abdomen or MRI of the  abdomen is recommended when possible.     This report was finalized on 4/3/2017 3:12 AM by Dr. Guillermo Huizar MD.       XR Chest 2 View [[id number]] Collected:  17 0704     Updated:  17    Narrative:        TWO-VIEW CHEST     HISTORY: Shortness of breath and cough.     FINDINGS:  There is nearly complete opacification of the left chest with  marked volume loss and mediastinal shift to the left and the appearance  is consistent with a combination of extensive atelectasis and also  extensive pneumonia and continued follow-up evaluation is recommended.  These findings are new since the study of 01/12/2016. The right lung  remains well-expanded and clear.     This report was finalized on 4/3/2017 8:51 PM by Dr. Guillermo Huizar MD.           Imaging Results (last 24 hours)     Procedure Component Value Units Date/Time    XR Chest 1 View [[id number]] Collected:  04/06/17 0834     Updated:  04/06/17 1209    Narrative:       CLINICAL HISTORY: 72-year-old female 2 days status post bronchoscopy and  left lung bronchial lavage for left lung collapse or consolidation.     EXAM: PORTABLE AP SEMIERECT CHEST DATED 04/06/2017 AT 0546 HOURS     FINDINGS: When compared to the erect AP and lateral chest radiographs of  04/03/2017 and the CT chest exam of 04/03/2017, there is improvement in  aeration of the left lung with apparently fairly good expansion of left  upper lobe but with some persistent patchy to dense atelectasis or  infiltrate in the left lower lobe. There is also development of some  patchy atelectasis or infiltrate in the right lower lung zone and  perihilar right lung. Right costophrenic angle is sharp. Left  costophrenic angle is obscured and may be blunted. Cardiac silhouette  remains top normal to mildly enlarged caliber. No pneumothorax is seen.  Diffuse prominence of pulmonary vascular and interstitial markings  persists and appears increased. Oxygen cannula tubing is present about  the neck and left shoulder. Monitoring lead wires are present. No acute  change in bony thorax is seen.     CONCLUSION: Improved aeration of left upper lobe but with persistent  left lower lobe atelectasis or consolidation and  developing patchy right  basilar atelectasis or infiltrate. Diffuse interstitial prominence in  the lungs is present.     This report was finalized on 4/6/2017 12:06 PM by Dr. Shen Almanza MD.             I reviewed the patient's new clinical results.  I personally viewed and interpreted the patient's imaging results:  CXR 4/6/17 compared to 4/3/17 shows significant improvement of airspace on left side with persistent lower lobe atelectasis and may be small consolidation.  There is been slight worsening of atelectasis on the right.        Medication Review:     amLODIPine 5 mg Oral Q24H   atorvastatin 10 mg Oral Nightly   bimatoprost 1 drop Both Eyes Nightly   budesonide 0.5 mg Nebulization BID - RT   carvedilol 3.125 mg Oral BID   ceftriaxone 1 g Intravenous Q24H   dorzolamide-timolol 1 drop Both Eyes BID   Eucerin original healing lotion  Topical Daily   fluticasone 1 spray Each Nare Daily   furosemide 40 mg Oral Daily   heparin (porcine) 5,000 Units Subcutaneous Q8H   ipratropium-albuterol 3 mL Nebulization Q6H While Awake - RT   letrozole 2.5 mg Oral Daily   levETIRAcetam 500 mg Oral Q12H   levothyroxine 75 mcg Oral Q AM   montelukast 10 mg Oral Daily   nystatin  Topical Q8H   pantoprazole 20 mg Oral BID AC   potassium chloride 20 mEq Oral Daily   sodium chloride 4 mL Nebulization BID - RT         lactated ringers 30 mL/hr Last Rate: Stopped (04/04/17 1308)       ASSESSMENT:   1. Acute hypoxemic respiratory failure  2. Left lower lobe consolidation concerning for pneumonia  3. Left multilobular atelectasis  4. Mucus plugging status post removal with bronchoscopy on 4/4/17  5. COPD  6. Hypernatremia- improved   7. History of breast cancer  8. Hypertension  9. Obstructive sleep apnea-noncompliant with CPAP  10. Morbid obesity  11. History of pulmonary embolism   12. chronic kidney disease  13. diarrhea    PLAN:    His cultures have been negative thus far and significant improvement, will  transition to oral  Omnicef  Encourage pulmonary hygiene with TCD B and flutter valve  Add amlodipine for elevated blood pressure   On subcutaneous heparin for DVT prophylaxis and continue to monitor hemoptysis, which has improved.      Disposition: Home with oxygen.  Would like to switch back to [facility] at discharge.  She has previously seen Dr. Alvarez and Dr. Rachel Patiño MD  04/06/17  4:31 PM          EMR Dragon/Transcription disclaimer:   Much of this encounter note is an electronic transcription/translation of spoken language to printed text. The electronic translation of spoken language may permit erroneous, or at times, nonsensical words or phrases to be inadvertently transcribed; Although I have reviewed the note for such errors, some may still exist.

## 2017-04-07 PROBLEM — J98.11 ATELECTASIS: Status: ACTIVE | Noted: 2017-01-01

## 2017-04-07 NOTE — PROGRESS NOTES
Continued Stay Note  [facility]     Patient Name: Britney Mendietar  MRN: [id number]  Today's Date: 4/7/2017    Admit Date: 4/3/2017          Discharge Plan       04/07/17 1640    Case Management/Social Work Plan    Plan To daughter's house with [facility]  per EMS    Patient/Family In Agreement With Plan yes    Additional Comments Called to Kely/Julien  to inform of planned DC today. Spoke with patient who plans EMS transport home. Disclaimer given and she is still agreeable. She has no preference to provider. Called to [facility] and booked earliest run at 2030. Patient is trying to reach her daughter Katie to inform of DC but not able to reach her yet. RN informed we need to contact before DC to let her in apartment. EMS packet prepared and given to RN.               Discharge Codes     None        Expected Discharge Date and Time     Expected Discharge Date Expected Discharge Time    Apr 7, 2017             Rupa Sinha, RN

## 2017-04-07 NOTE — PLAN OF CARE
Problem: Patient Care Overview (Adult)  Goal: Plan of Care Review    04/07/17 1557   Coping/Psychosocial Response Interventions   Plan Of Care Reviewed With patient   Patient Care Overview   Progress progress toward functional goals is gradual   Outcome Evaluation   Outcome Summary/Follow up Plan able to tolerate standing assist x 2 today EOB with encouragement. pt states has not walked in 6 months and they just got a elizabet lift. will cont to encouraged mobility as jennifer, may be near baseline.

## 2017-04-07 NOTE — DISCHARGE SUMMARY
DISCHARGE SUMMARY    Patient Name: Britney Ellison  Age/Sex: 72 y.o. female  : 1945  MRN: [id number]  Patient Care Team:  Khanh Chaudhary MD as PCP - General (Internal Medicine)  Christine Calderon MD as Consulting Physician (Hematology and Oncology)       Date of Admit: 4/3/2017  Date of Discharge:  17  Discharge Condition: Good    Discharge Diagnoses:  1. Acute hypoxemic respiratory failure  2. Left lower lobe consolidation concerning for pneumonia  3. Left multilobular atelectasis  4. Mucus plugging status post removal with bronchoscopy on 17  5. COPD  6. Hypernatremia- improved   7. History of breast cancer  8. Hypertension  9. Obstructive sleep apnea-noncompliant with CPAP  10. Morbid obesity  11. History of pulmonary embolism   12. chronic kidney disease  13. Diarrhea- resolved    History of present illness:   This is a 72-year-old female patient, former smoker (15 packs year), with history of COPD, who presented with cough. It started 10 days ago and has been getting worse. It's productive of large thick whitish phlegm. Associated symptoms include dyspnea, not resolving by her rescue inhaler which she has been using frequently. She also reported chest pain which initially started on the left back but recently migrated to the left anterior chest. It's pleuritic in nature.     She was visited by M.D. to you and had an x-ray at home which reportedly showed whiteout left lung and therefore she was treated with levofloxacin which she finished. She reported very minimal improvement with the antibiotic.     Patient has a history of right breast cancer, S/PE lumpectomy 2 years ago and also had 1 session of chemotherapy which she did not tolerate. He did not have radiation. She is currently on hormonal therapy. She has been bedridden over the last year following the effect of chemotherapy which resulted in profound weakness.       Hospital Course:   Britney Ellison presented to [facility] on 4/3/17 and had a CT of the chest that showed left lower lobe consolidation thought to be atelectasis versus pneumonia with possible left bronchial obstruction due to mucous plugging versus tumor. A bronchoscopy on 4/4/17 with mucous plug removal and there is no sign of lesions. She was treated with Rocephin and azithromycin. She had an allergic reaction to the azithromycin, which was discontinued.  The x-ray 4/6/17 showed significant improvement of air space and left lung with persistent left lower lobe consolidation and atelectasis.  She was transitioned to oral Omnicef on 4/6/17.  Cultures have shown no growth to date.     On 4/6/17 she started having episodes of diarrhea, which improved change in antibiotics.     He did have some hyponatremia that improved with IV fluids    Consults:   IP CONSULT TO PULMONOLOGY  IP CONSULT TO PULMONOLOGY    Significant Discharge Diagnostics   Procedures Performed:  Procedure(s):  BRONCHOSCOPY with BAL of left lower lobe    Procedure:  14. Bronchoscopy, Diagnostic  15. Bronchoscopy, Therapeutic  16. Bronchoalveolar lavage, BAL     Pre-Operative Diagnosis:     Post-Operative Diagnosis: Same     Indication:     Anesthesia: Monitored Anesthesia Care (MAC)     Procedure Details: Patient was consented for the procedure with all risk and benefit of the procedure explained in detail. Patient was given the opportunity to ask questions and all concerns were answered. The bronchocope was inserted into the main airway via the LMA. An anatomical survey was done of the main airways and the subsegmental bronchus. The findings are reported above. A bronchialalveolar lavage was performed using aliquots of normal saline instilled into the lower lobe airways then aspirated back.     Findings: Mucus plugging noted with no endobronchial lesion noted.     Estimated Blood Loss: Minimal      Specimens: Sent purulent fluid      Complications:  None; patient tolerated the procedure  well.      Disposition: PACU - hemodynamically stable.        Patient tolerated the procedure well.     Coleen Ramos MD    Pertinent Lab Results:    Results from last 7 days  Lab Units 04/05/17  0509 04/03/17  0158   SODIUM mmol/L 142 146*   POTASSIUM mmol/L 3.9 3.7   CHLORIDE mmol/L 106 106   TOTAL CO2 mmol/L 26.0 29.6*   BUN mg/dL 19 21   CREATININE mg/dL 1.02* 1.20*   GLUCOSE mg/dL 86 136*   CALCIUM mg/dL 8.4* 8.5*   AST (SGOT) U/L  --  28   ALT (SGPT) U/L  --  12       Results from last 7 days  Lab Units 04/03/17  0158   TROPONIN T ng/mL <0.010       Results from last 7 days  Lab Units 04/03/17  0158   WBC 10*3/mm3 5.64   HEMOGLOBIN g/dL 11.5*   HEMATOCRIT % 36.6   PLATELETS 10*3/mm3 162   MCV fL 102.8*   MCH pg 32.3*   MCHC g/dL 31.4*   RDW % 13.0   RDW-SD fl 48.9   MPV fL 10.3   NEUTROPHIL % % 59.5   LYMPHOCYTE % % 27.3   MONOCYTES % % 6.9   EOSINOPHIL % % 5.9   BASOPHIL % % 0.4   IMM GRAN % % 0.0   NEUTROS ABS 10*3/mm3 3.36   LYMPHS ABS 10*3/mm3 1.54   MONOS ABS 10*3/mm3 0.39   EOS ABS 10*3/mm3 0.33   BASOS ABS 10*3/mm3 0.02   IMMATURE GRANS (ABS) 10*3/mm3 0.00       Results from last 7 days  Lab Units 04/04/17  0410   INR  1.11*   APTT seconds 32.7               Results from last 7 days  Lab Units 04/03/17  0158   PROBNP pg/mL 913.1*               Results from last 7 days  Lab Units 04/03/17  0158   PROCALCITONIN ng/mL 0.58*               Results from last 7 days  Lab Units 04/04/17  1233   CULTURE  50,000 CFU/mL Normal Respiratory Debbie   GRAM STAIN RESULT  Few (2+) WBCs seen  Few (2+) Epithelial cells per low power field  No organisms seen                   Imaging Results:  Imaging Results (most recent)     Procedure Component Value Units Date/Time    CT Chest Without Contrast [[id number]] Collected:  04/03/17 0301     Updated:  04/03/17 0315    Narrative:       CT SCAN OF THE CHEST WITHOUT CONTRAST     HISTORY: Shortness of breath. Has extensive atelectasis and pneumonia in  the left chest noted on  recent chest x-ray.     The CT scan was performed as an emergency procedure through the chest  without contrast and demonstrates the followin. The CT scan shows extensive dense atelectasis involving the entire  left upper lobe and there is also fairly extensive pneumonia scattered  throughout the left lower lobe and associated with a tiny pleural  effusion. There is marked volume loss with mediastinal shift to the  left. No definite underlying tumor mass is currently seen but continued  follow-up evaluation is recommended by chest x-ray to ensure appropriate  resolution.  2. The right lung is well-expanded and clear except for a calcified  granuloma in the right upper lobe. There is moderate generalized  cardiomegaly. No adenopathy is seen. There is no pericardial effusion.  3. There is some image degradation related to the patient's marked  obesity. The CT images through the upper liver demonstrate a vaguely  lobulated contour highly suspicious for changes of cirrhosis and this is  unchanged from previous CT scans. There is also an area of somewhat  vague low-density in the left lobe that appears to be new since the  unenhanced CT scan of 2015 as well as the enhanced CT scan of  2015. The patient has history of breast cancer and this could  potentially represent a metastatic lesion or primary liver lesion and  further evaluation with enhanced CT scan of the abdomen or MRI of the  abdomen is recommended when possible.     This report was finalized on 4/3/2017 3:12 AM by Dr. Guillermo Huizar MD.       XR Chest 2 View [[id number]] Collected:  17     Updated:  17    Narrative:       TWO-VIEW CHEST     HISTORY: Shortness of breath and cough.     FINDINGS:  There is nearly complete opacification of the left chest with  marked volume loss and mediastinal shift to the left and the appearance  is consistent with a combination of extensive atelectasis and also  extensive pneumonia and  continued follow-up evaluation is recommended.  These findings are new since the study of 01/12/2016. The right lung  remains well-expanded and clear.     This report was finalized on 4/3/2017 8:51 PM by Dr. Guillermo Huizar MD.       XR Chest 1 View [[id number]] Collected:  04/06/17 0834     Updated:  04/06/17 1209    Narrative:       CLINICAL HISTORY: 72-year-old female 2 days status post bronchoscopy and  left lung bronchial lavage for left lung collapse or consolidation.     EXAM: PORTABLE AP SEMIERECT CHEST DATED 04/06/2017 AT 0546 HOURS     FINDINGS: When compared to the erect AP and lateral chest radiographs of  04/03/2017 and the CT chest exam of 04/03/2017, there is improvement in  aeration of the left lung with apparently fairly good expansion of left  upper lobe but with some persistent patchy to dense atelectasis or  infiltrate in the left lower lobe. There is also development of some  patchy atelectasis or infiltrate in the right lower lung zone and  perihilar right lung. Right costophrenic angle is sharp. Left  costophrenic angle is obscured and may be blunted. Cardiac silhouette  remains top normal to mildly enlarged caliber. No pneumothorax is seen.  Diffuse prominence of pulmonary vascular and interstitial markings  persists and appears increased. Oxygen cannula tubing is present about  the neck and left shoulder. Monitoring lead wires are present. No acute  change in bony thorax is seen.     CONCLUSION: Improved aeration of left upper lobe but with persistent  left lower lobe atelectasis or consolidation and developing patchy right  basilar atelectasis or infiltrate. Diffuse interstitial prominence in  the lungs is present.     This report was finalized on 4/6/2017 12:06 PM by Dr. Shen Almanza MD.         CXR 4/6/17 compared to 4/3/17 shows significant improvement of airspace on left side with persistent lower lobe atelectasis and may be small consolidation. There is been slight worsening of  atelectasis on the right.    Objective:   Temp:  [97.9 °F (36.6 °C)-98.9 °F (37.2 °C)] 98 °F (36.7 °C)  Heart Rate:  [58-61] 59  Resp:  [18-20] 18  BP: (127-190)/(46-76) 190/62   SpO2:  [92 %-99 %] 98 %  on  Flow (L/min):  [2] 2 O2 Device: nasal cannula    Intake/Output Summary (Last 24 hours) at 04/07/17 1621  Last data filed at 04/07/17 1423   Gross per 24 hour   Intake              600 ml   Output             3350 ml   Net            -2750 ml     Body mass index is 48.06 kg/(m^2).  Last 3 weights    04/03/17  0032   Weight: 280 lb (127 kg)     Weight change:     Physical Exam:  GEN:  No acute distress, alert, cooperative, well developed obese, on O2 per nasal cannula  EYES:  Sclera clear. No icterus. PERRL. Normal EOM  ENT: External ears/nose normal, no oral lesions, no thrush, mucous membranes moist  NECK:  Supple, midline trachea, no JVD  LUNGS: Normal chest on inspection, diminished bilateral lower lobe L>R minimal expiratory wheeze. No Rhonchi. No crackles. Respirations regular, even and unlabored.   CV:  Regular rhythm and rate. Normal S1/S2. No murmurs, gallops, or rubs noted.  ABD: Soft, non-tender and non-distended. Normal bowel sounds. No guarding  EXT:  Moves all extremities well. No cyanosis. No redness. No edema.   Skin: dry, intact, no bleeding    Discharge Medications and Instructions:     Discharge Medications   Britney Ellison   Home Medication Instructions ELIAS:[id number]    Printed on:04/07/17 1621   Medication Information                      amLODIPine (NORVASC) 5 MG tablet  Take 1 tablet by mouth Daily.             aspirin 81 MG tablet  Take 1 tablet by mouth daily.             atorvastatin (LIPITOR) 10 MG tablet  TAKE ONE TABLET BY MOUTH DAILY             bimatoprost (LUMIGAN) 0.01 % ophthalmic drops  Administer 1 drop to both eyes daily. in the evening             budesonide (PULMICORT) 0.5 MG/2ML nebulizer solution  Budesonide 0.5 MG/2ML Inhalation Suspension; Patient Sig: Budesonide 0.5  MG/2ML Inhalation Suspension ; 0; 10-Sep-2015; Active             carvedilol (COREG) 3.125 MG tablet  Take 1 tablet by mouth 2 (two) times a day. Hold for systolic blood press less then 100 or heart rate             cyclobenzaprine (FLEXERIL) 10 MG tablet  Take 1 tablet by mouth 2 (two) times a day as needed for muscle spasms.             dorzolamide-timolol (COSOPT) 22.3-6.8 MG/ML ophthalmic solution  Administer 1 drop to both eyes 2 (two) times a day.               fluticasone (FLONASE) 50 MCG/ACT nasal spray  Inhale one spray in each nostril daily             furosemide (LASIX) 40 MG tablet  TAKE ONE TABLET BY MOUTH EVERY MORNING             GAS RELIEF 125 MG capsule capsule  1 capsule.             guaifenesin (ROBITUSSIN) 100 MG/5ML liquid  Take by mouth every 4 (four) hours as needed. 5 - 10 ML             HYDROcodone-acetaminophen (NORCO) 5-325 MG per tablet  Take 1 tablet by mouth Every 6 (Six) Hours As Needed.             KLOR-CON 20 MEQ CR tablet  TAKE TWO TABLETS BY MOUTH DAILY             letrozole (FEMARA) 2.5 MG tablet  TAKE ONE TABLET BY MOUTH DAILY             levETIRAcetam (KEPPRA) 500 MG tablet  TAKE ONE TABLET BY MOUTH TWICE A DAY             levothyroxine (SYNTHROID, LEVOTHROID) 75 MCG tablet  TAKE ONE TABLET BY MOUTH DAILY             loratadine (CLARITIN) 10 MG tablet  TAKE ONE TABLET BY MOUTH DAILY             Menthol-Methyl Salicylate (MUSCLE RUB EX)               mometasone (NASONEX) 50 MCG/ACT nasal spray  2 sprays into each nostril daily.             montelukast (SINGULAIR) 10 MG tablet  TAKE ONE TABLET BY MOUTH DAILY             Neomycin-Bacitracin-Polymyxin (HCA TRIPLE ANTIBIOTIC OINTMENT EX)               nystatin (NYAMYC) 808219 UNIT/GM powder topical powder  APPLY TO AFFECTED AREA(S) THREE TIMES A DAY             omeprazole (PriLOSEC) 20 MG capsule  Take 1 capsule by mouth 2 (two) times a day.             Polyethylene Glycol 3350 powder  Mix 1 capful (17GM) in 8 ounces of water, juice,  or tea and drink daily             Prenatal Vit-Fe Fumarate-FA (PNV PRENATAL PLUS MULTIVITAMIN) 27-1 MG tablet  Take 1 mg by mouth daily.             prochlorperazine (COMPAZINE) 10 MG tablet  TAKE ONE TABLET BY MOUTH EVERY 6 HOURS AS NEEDED FOR NAUSEA             saccharomyces boulardii (FLORASTOR) 250 MG capsule  Take 250 mg by mouth 2 (Two) Times a Day.             vitamin D (ERGOCALCIFEROL) 47517 UNITS capsule capsule  Take 1 capsule by mouth every 14 (fourteen) days.             Zinc Oxide 10 % ointment                   Discharge Diet:         Dietary Orders            Start     Ordered    04/04/17 1411  Diet Regular  Diet Effective Now     Question:  Diet Texture / Consistency  Answer:  Regular    04/04/17 1410          Activity at Discharge:    Activity Instructions     Activity as Tolerated           Measure Blood Pressure                      Discharge disposition: Home    Discharge Instructions and Follow ups:  Patient would like to follow up with [facility] as outpatient, she reported that she did see Dr Conn years ago and will arrange for follow up with [facility]  Follow-up Information     Follow up with [facility] .    Specialty:  Home Health Services    Contact information:    [address] , [address]  [facility] [address] [phone]        Follow up with Khanh Chaudhary MD Follow up in 1 week(s).    Specialty:  Internal Medicine    Why:  recent hospitalization. review medications. check bp    Contact information:    [address]  [address]  [facility] [id number] [phone]          Follow up with Kerline Alvarez MD Follow up in 4 week(s).    Specialties:  Pulmonary Disease, Sleep Medicine    Why:  with follow up imaging     Contact information:    [address]  [facility] [address]  [phone]          No future appointments.     Medication Reconciliation: Please see electronically completed Med Rec.    Total time spent discharging patient including  evaluation, medication reconciliation, arranging follow up, and post hospitalization instructions and education total time exceeds 30 minutes.     Raul Patiño MD  04/07/17  4:21 PM    EMR Dragon/Transcription disclaimer:   Much of this encounter note is an electronic transcription/translation of spoken language to printed text. The electronic translation of spoken language may permit erroneous, or at times, nonsensical words or phrases to be inadvertently transcribed; Although I have reviewed the note for such errors, some may still exist.

## 2017-04-07 NOTE — PROGRESS NOTES
PROGRESS NOTE   LOS: 4 days   Patient Care Team:  Khanh Chaudhary MD as PCP - General (Internal Medicine)  Christine Calderon MD as Consulting Physician (Hematology and Oncology)    Chief Complaint: Shortness of breath, cough and chest pain    Interval History: Patient was admitted on 4/3/17 and had a CT of the chest that showed left lower lobe consolidation thought to be atelectasis versus pneumonia with possible left bronchial obstruction due to mucous plugging versus tumor.  A bronchoscopy on 4/4/17 with mucous plug removal and there is no sign of lesions.  She was treated with Rocephin and azithromycin.  She had an allergic reaction to the azithromycin, which was discontinued.  The x-ray 4/6/17 showed significant improvement of air space and left lung with persistent left lower lobe consolidation and atelectasis.    On 4/6/17 she started having episodes of diarrhea, which improved.    He did have some hyponatremia that improved with IV fluids.     REVIEW OF SYSTEMS:   CARDIOVASCULAR:  No palpitations or edema.  Reports some chest tightness and sharp chest pains on the left side worse with cough  RESPIRATORY: Mild shortness of breath worsened with minimal exertion.  Cough with productive thinner yellow secretions and slightly blood-tinged  GASTROINTESTINAL: No anorexia, nausea, vomiting . No abdominal pain or blood.  No more diarrhea  HEMATOLOGIC: No bleeding or bruising.     Ventilator/Non-Invasive Ventilation Settings     None        Vital Signs  Temp:  [97.9 °F (36.6 °C)-98.9 °F (37.2 °C)] 97.9 °F (36.6 °C)  Heart Rate:  [55-61] 59  Resp:  [18-20] 20  BP: (127-164)/(46-62) 164/62  SpO2:  [92 %-99 %] 93 %  on  Flow (L/min):  [2] 2 O2 Device: nasal cannula    Intake/Output Summary (Last 24 hours) at 04/07/17 1159  Last data filed at 04/07/17 0503   Gross per 24 hour   Intake              840 ml   Output             2850 ml   Net            -2010 ml     Flowsheet Rows         First Filed Value    Admission  "Height  64\" (162.6 cm) Documented at 04/03/2017 0032    Admission Weight  280 lb (127 kg) Documented at 04/03/2017 0032        Last 3 weights    04/03/17  0032   Weight: 280 lb (127 kg)       Physical Exam:  GEN:  No acute distress, alert, cooperative, well developed obese, on O2 per nasal cannula  EYES:   Sclera clear. No icterus. PERRL. Normal EOM  ENT:   External ears/nose normal, no oral lesions, no thrush, mucous membranes moist  NECK:  Supple, midline trachea, no JVD  LUNGS: Normal chest on inspection, diminished bilateral lower lobe L>R minimal expiratory wheeze. No Rhonchi.  No crackles. Respirations regular, even and unlabored.   CV:  Regular rhythm and rate. Normal S1/S2. No murmurs, gallops, or rubs noted.  ABD:  Soft, non-tender and non-distended. Normal bowel sounds. No guarding  EXT:  Moves all extremities well. No cyanosis. No redness. No edema.   Skin: dry, intact, no bleeding    Results Review:        Results from last 7 days  Lab Units 04/05/17  0509 04/03/17  0158   SODIUM mmol/L 142 146*   POTASSIUM mmol/L 3.9 3.7   CHLORIDE mmol/L 106 106   TOTAL CO2 mmol/L 26.0 29.6*   BUN mg/dL 19 21   CREATININE mg/dL 1.02* 1.20*   CALCIUM mg/dL 8.4* 8.5*   AST (SGOT) U/L  --  28   ALT (SGPT) U/L  --  12       Results from last 7 days  Lab Units 04/03/17  0158   TROPONIN T ng/mL <0.010       Results from last 7 days  Lab Units 04/03/17  0158   WBC 10*3/mm3 5.64   HEMOGLOBIN g/dL 11.5*   HEMATOCRIT % 36.6   PLATELETS 10*3/mm3 162   NEUTROPHIL % % 59.5   LYMPHOCYTE % % 27.3   MONOCYTES % % 6.9   EOSINOPHIL % % 5.9   BASOPHIL % % 0.4   IMM GRAN % % 0.0       Results from last 7 days  Lab Units 04/04/17  0410   INR  1.11*   APTT seconds 32.7                     No results found for: POCGLU    Results from last 7 days  Lab Units 04/03/17  0158   PROCALCITONIN ng/mL 0.58*       Results from last 7 days  Lab Units 04/03/17  0158   PROBNP pg/mL 913.1*       Results from last 7 days  Lab Units 04/04/17  1233   CULTURE "  50,000 CFU/mL Normal Respiratory Debbie   GRAM STAIN RESULT  Few (2+) WBCs seen  Few (2+) Epithelial cells per low power field  No organisms seen                 Imaging Results (all)     Procedure Component Value Units Date/Time    CT Chest Without Contrast [[id number]] Collected:  17 0301     Updated:  17    Narrative:       CT SCAN OF THE CHEST WITHOUT CONTRAST     HISTORY: Shortness of breath. Has extensive atelectasis and pneumonia in  the left chest noted on recent chest x-ray.     The CT scan was performed as an emergency procedure through the chest  without contrast and demonstrates the followin. The CT scan shows extensive dense atelectasis involving the entire  left upper lobe and there is also fairly extensive pneumonia scattered  throughout the left lower lobe and associated with a tiny pleural  effusion. There is marked volume loss with mediastinal shift to the  left. No definite underlying tumor mass is currently seen but continued  follow-up evaluation is recommended by chest x-ray to ensure appropriate  resolution.  2. The right lung is well-expanded and clear except for a calcified  granuloma in the right upper lobe. There is moderate generalized  cardiomegaly. No adenopathy is seen. There is no pericardial effusion.  3. There is some image degradation related to the patient's marked  obesity. The CT images through the upper liver demonstrate a vaguely  lobulated contour highly suspicious for changes of cirrhosis and this is  unchanged from previous CT scans. There is also an area of somewhat  vague low-density in the left lobe that appears to be new since the  unenhanced CT scan of 2015 as well as the enhanced CT scan of  2015. The patient has history of breast cancer and this could  potentially represent a metastatic lesion or primary liver lesion and  further evaluation with enhanced CT scan of the abdomen or MRI of the  abdomen is recommended when possible.        This report was finalized on 4/3/2017 3:12 AM by Dr. Guillermo Huizar MD.       XR Chest 2 View [[id number]] Collected:  04/03/17 0704     Updated:  04/03/17 2054    Narrative:       TWO-VIEW CHEST     HISTORY: Shortness of breath and cough.     FINDINGS:  There is nearly complete opacification of the left chest with  marked volume loss and mediastinal shift to the left and the appearance  is consistent with a combination of extensive atelectasis and also  extensive pneumonia and continued follow-up evaluation is recommended.  These findings are new since the study of 01/12/2016. The right lung  remains well-expanded and clear.     This report was finalized on 4/3/2017 8:51 PM by Dr. Guillermo Huizar MD.           Imaging Results (last 24 hours)     Procedure Component Value Units Date/Time    XR Chest 1 View [[id number]] Collected:  04/06/17 0834     Updated:  04/06/17 1209    Narrative:       CLINICAL HISTORY: 72-year-old female 2 days status post bronchoscopy and  left lung bronchial lavage for left lung collapse or consolidation.     EXAM: PORTABLE AP SEMIERECT CHEST DATED 04/06/2017 AT 0546 HOURS     FINDINGS: When compared to the erect AP and lateral chest radiographs of  04/03/2017 and the CT chest exam of 04/03/2017, there is improvement in  aeration of the left lung with apparently fairly good expansion of left  upper lobe but with some persistent patchy to dense atelectasis or  infiltrate in the left lower lobe. There is also development of some  patchy atelectasis or infiltrate in the right lower lung zone and  perihilar right lung. Right costophrenic angle is sharp. Left  costophrenic angle is obscured and may be blunted. Cardiac silhouette  remains top normal to mildly enlarged caliber. No pneumothorax is seen.  Diffuse prominence of pulmonary vascular and interstitial markings  persists and appears increased. Oxygen cannula tubing is present about  the neck and left shoulder. Monitoring lead wires are  present. No acute  change in bony thorax is seen.     CONCLUSION: Improved aeration of left upper lobe but with persistent  left lower lobe atelectasis or consolidation and developing patchy right  basilar atelectasis or infiltrate. Diffuse interstitial prominence in  the lungs is present.     This report was finalized on 4/6/2017 12:06 PM by Dr. Shen Almanza MD.             I reviewed the patient's new clinical results.  I personally viewed and interpreted the patient's imaging results:  CXR 4/6/17 compared to 4/3/17 shows significant improvement of airspace on left side with persistent lower lobe atelectasis and may be small consolidation.  There is been slight worsening of atelectasis on the right.        Medication Review:     amLODIPine 5 mg Oral Q24H   atorvastatin 10 mg Oral Nightly   bimatoprost 1 drop Both Eyes Nightly   budesonide 0.5 mg Nebulization BID - RT   carvedilol 3.125 mg Oral BID   cefdinir 300 mg Oral Q12H   dorzolamide-timolol 1 drop Both Eyes BID   Eucerin original healing lotion  Topical Daily   fluticasone 1 spray Each Nare Daily   furosemide 40 mg Oral Daily   heparin (porcine) 5,000 Units Subcutaneous Q8H   ipratropium-albuterol 3 mL Nebulization Q6H While Awake - RT   letrozole 2.5 mg Oral Daily   levETIRAcetam 500 mg Oral Q12H   levothyroxine 75 mcg Oral Q AM   montelukast 10 mg Oral Daily   nystatin  Topical Q8H   pantoprazole 20 mg Oral BID AC   potassium chloride 20 mEq Oral Daily   sodium chloride 4 mL Nebulization BID - RT         lactated ringers 30 mL/hr Last Rate: Stopped (04/04/17 1308)       ASSESSMENT:   1. Acute hypoxemic respiratory failure  2. Left lower lobe consolidation concerning for pneumonia  3. Left multilobular atelectasis  4. Mucus plugging status post removal with bronchoscopy on 4/4/17  5. COPD  6. Hypernatremia- improved   7. History of breast cancer  8. Hypertension  9. Obstructive sleep apnea-noncompliant with CPAP  10. Morbid obesity  11. History of  pulmonary embolism   12. chronic kidney disease  13. diarrhea    PLAN:    His cultures have been negative thus far and significant improvement,   Continue oral Omnicef  Encourage pulmonary hygiene with TCD B and flutter valve  On subcutaneous heparin for DVT prophylaxis and continue to monitor hemoptysis, which has improved.      Disposition: Home with oxygen.  Would like to switch back to [facility] at discharge.  She has previously seen Dr. Alvarez and Dr. Rachel Jaimes, APRN  04/07/17  11:59 AM      EMR Dragon/Transcription disclaimer:   Much of this encounter note is an electronic transcription/translation of spoken language to printed text. The electronic translation of spoken language may permit erroneous, or at times, nonsensical words or phrases to be inadvertently transcribed; Although I have reviewed the note for such errors, some may still exist.

## 2017-04-07 NOTE — PROGRESS NOTES
Acute Care - Physical Therapy Treatment Note  [facility]     Patient Name: Britney Ellison  : 1945  MRN: [id number]  Today's Date: 2017  Onset of Illness/Injury or Date of Surgery Date: 17          Admit Date: 4/3/2017    Visit Dx:    ICD-10-CM ICD-9-CM   1. Pleural effusion, left J90 511.9   2. Atelectasis J98.11 518.0   3. Generalized weakness R53.1 780.79     Patient Active Problem List   Diagnosis   • Congestive heart failure   • Coagulation factor deficiency syndrome   • Gastrointestinal ulcer due to Helicobacter pylori   • Hyperlipidemia   • Hypertension   • Hypokalemia   • Hypothyroidism   • Meningioma   • Meralgia paresthetica   • Chronic obstructive pulmonary disease with acute exacerbation   • Impaired glucose tolerance   • Cobalamin deficiency   • Vitamin D deficiency   • Follow-up examination, following other surgery   • Midline low back pain without sciatica   • Chronic pain   • DDD (degenerative disc disease), lumbar   • Lumbar radiculopathy   • Long term current use of opiate analgesic   • History of discitis   • Malignant neoplasm of upper-outer quadrant of right female breast   • Pleural effusion, left   • Atelectasis               Adult Rehabilitation Note       17 1551 17 1400 17 1500    Rehab Assessment/Intervention    Discipline physical therapist  -LH physical therapy assistant  -CW physical therapy assistant  -CW    Document Type  therapy note (daily note)  -CW therapy note (daily note)  -CW    Subjective Information agree to therapy;complains of;pain  - agree to therapy;complains of;weakness;fatigue;pain  -CW agree to therapy;complains of;weakness;fatigue;pain  -CW    Patient Effort, Rehab Treatment good  -LH adequate  -CW adequate  -CW    Precautions/Limitations fall precautions  -LH fall precautions  -CW fall precautions  -CW    Recorded by [] Radha White, PT [CW] Roberto Clay [CW] Roberto Clay    Pain Assessment    Pain Assessment 0-10   "-LH 0-10  -CW 0-10  -CW    Pain Score 4  -LH 0  -CW 3  -CW    Post Pain Score 4  -LH 6  -CW     Pain Location Foot  -LH Back  -CW Chest  -CW    Pain Orientation Left;Right  -LH      Pain Radiating Towards \"around ankles for 6 months\"  -LH      Pain Intervention(s) Repositioned  - Medication (See MAR)  -CW Repositioned;Ambulation/increased activity  -CW    Recorded by [LH] Radha White, PT [CW] Roberto Clay [CW] Roberto Clay    Cognitive Assessment/Intervention    Current Cognitive/Communication Assessment functional  -LH functional  -CW functional  -CW    Orientation Status oriented x 4  -LH oriented x 4  -CW oriented x 4  -CW    Follows Commands/Answers Questions 100% of the time;able to follow single-step instructions  -% of the time;able to follow single-step instructions;needs cueing  -% of the time;able to follow single-step instructions;needs cueing  -CW    Personal Safety WNL/WFL  -LH WNL/WFL  -CW WNL/WFL;decreased awareness, need for safety;decreased insight to deficits  -CW    Personal Safety Interventions fall prevention program maintained;gait belt;nonskid shoes/slippers when out of bed;supervised activity  -LH fall prevention program maintained;gait belt;nonskid shoes/slippers when out of bed  -CW fall prevention program maintained;gait belt;nonskid shoes/slippers when out of bed  -CW    Recorded by [LH] Radha White, PT [CW] Roberto Clay [CW] Roberto Clay    Bed Mobility, Assessment/Treatment    Bed Mob, Supine to Sit, [place] moderate assist (50% patient effort)  -LH verbal cues required;nonverbal cues required (demo/gesture);maximum assist (25% patient effort)  -CW verbal cues required;nonverbal cues required (demo/gesture);maximum assist (25% patient effort)  -CW    Bed Mob, Sit to Supine, [place] moderate assist (50% patient effort);2 person assist required  -LH verbal cues required;nonverbal cues required (demo/gesture);moderate assist (50% patient " effort)  -CW verbal cues required;nonverbal cues required (demo/gesture);moderate assist (50% patient effort)  -CW    Recorded by [] Radha White, PT [CW] Roberto Clay [CW] Roberto Clay    Transfer Assessment/Treatment    Transfers, Sit-Stand [place] moderate assist (50% patient effort);2 person assist required;hand held assist  -LH unable to perform;not tested  -CW not tested;unable to perform  -CW    Transfers, Stand-Sit [place] moderate assist (50% patient effort);2 person assist required;hand held assist  -LH      Transfer, Impairments strength decreased;ROM decreased;pain  -LH      Transfer, Comment HHA blocking BLEs to stand, able to clear buttocks   -LH      Recorded by [] Radha White, PT [CW] Roberto Clay [CW] Roberto Clay    Gait Assessment/Treatment    Gait, [place] Level unable to perform;not tested  -LH not tested;unable to perform  -CW not tested;unable to perform  -CW    Recorded by [] Radha White, PT [CW] Roberto Clay [CW] Roberto Clay    Motor Skills/Interventions    Additional Documentation Balance Skills Training (Group)  -LH      Recorded by [] Radha White PT      Balance Skills Training    Sitting-Level of Assistance   Close supervision  -CW    Standing-Level of Assistance Moderate assistance;x2  -LH      Static Standing Balance Support Right upper extremity supported;Left upper extremity supported   HHA  -      Standing Balance # of Minutes --   10 sec - HHA. limited by pain marlene feet  -LH      Recorded by [] Radha White, PT  [CW] Roberto Clay    Therapy Exercises    Bilateral Lower Extremities AROM:;10 reps;sitting;ankle pumps/circles;LAQ  -LH AROM:;10 reps;sitting;ankle pumps/circles;glut sets;hip flexion;LAQ  -CW AROM:;10 reps;sitting;ankle pumps/circles;glut sets;hip flexion;LAQ  -CW    Recorded by [] Radha White, PT [CW] Roberto Clay [CW] Roberto Clay    Positioning and Restraints    Pre-Treatment  Position in bed  -LH in bed  -CW in bed  -CW    Post Treatment Position bed  -LH bed  -CW bed  -CW    In Bed supine;call light within reach;encouraged to call for assist;exit alarm on  -LH notified nsg;supine;call light within reach;encouraged to call for assist  -CW notified nsg;supine;call light within reach;encouraged to call for assist  -CW    Recorded by [LH] Radha White, PT [CW] Roberto Clay [CW] Roberto Clay      User Key  (r) = Recorded By, (t) = Taken By, (c) = Cosigned By    Initials Name Effective Dates     Radha White, PT 02/07/17 -     CW Roberto Clay 12/13/16 -                 IP PT Goals       04/04/17 1447          Bed Mobility PT LTG    Bed Mobility PT LTG, Time to Achieve 1 wk  -CH      Bed Mobility PT LTG, Activity Type all bed mobility  -CH      Bed Mobility PT LTG, [place] Level minimum assist (75% patient effort)  -CH      Transfer Training PT LTG    Transfer Training PT LTG, Time to Achieve 1 wk  -CH      Transfer Training PT LTG, Activity Type sit to stand/stand to sit  -CH      Transfer Training PT LTG, [place] Level moderate assist (50% patient effort);2 person assist required  -CH      Transfer Training PT LTG, Assist Device walker, rolling  -CH      Transfer Training 2 PT LTG    Transfer Training PT 2 LTG, Time to Achieve 1 wk  -CH      Transfer Training PT 2 LTG, Activity Type bed to chair /chair to bed  -CH      Transfer Training PT 2 LTG, [place] Level moderate assist (50% patient effort);2 person assist required  -CH      Transfer Training PT 2 LTG, Assist Device walker, rolling  -CH        User Key  (r) = Recorded By, (t) = Taken By, (c) = Cosigned By    Initials Name Provider Type    FRANCISCO Doe PT Physical Therapist          Physical Therapy Education     Title: PT OT SLP Therapies (Done)     Topic: Physical Therapy (Done)     Point: Mobility training (Done)    Learning Progress Summary    Learner Readiness Method Response Comment  Documented by Status   Patient Acceptance E NR,VU   04/07/17 1557 Done    Acceptance TB,E DU,VU  CW 04/06/17 1459 Done    Acceptance E,TB DU,VU  CW 04/05/17 1516 Done    Acceptance TB,E,D VU,NR   04/04/17 1446 Done               Point: Home exercise program (Done)    Learning Progress Summary    Learner Readiness Method Response Comment Documented by Status   Patient Acceptance E NR,VU   04/07/17 1557 Done    Acceptance TB,E DU,VU  CW 04/06/17 1459 Done    Acceptance E,TB DU,VU  CW 04/05/17 1516 Done               Point: Body mechanics (Done)    Learning Progress Summary    Learner Readiness Method Response Comment Documented by Status   Patient Acceptance E NR,VU   04/07/17 1557 Done    Acceptance TB,E DU,VU  CW 04/06/17 1459 Done    Acceptance E,TB DU,VU  CW 04/05/17 1516 Done    Acceptance TB,E,D VU,NR   04/04/17 1446 Done               Point: Precautions (Done)    Learning Progress Summary    Learner Readiness Method Response Comment Documented by Status   Patient Acceptance E NR,VU   04/07/17 1557 Done    Acceptance TB,E DU,VU  CW 04/06/17 1459 Done    Acceptance E,TB DU,VU  CW 04/05/17 1516 Done    Acceptance TB,E,D VU,NR   04/04/17 1446 Done                      User Key     Initials Effective Dates Name Provider Type Discipline     12/01/15 -  Alana Doe, PT Physical Therapist PT     02/07/17 -  Radha White, PT Physical Therapist PT     12/13/16 -  Roberto Clay Physical Therapy Assistant PT                    PT Recommendation and Plan  Anticipated Discharge Disposition: home with home health  Planned Therapy Interventions: balance training, bed mobility training, home exercise program, patient/family education, transfer training  PT Frequency: daily  Plan of Care Review  Plan Of Care Reviewed With: patient  Progress: progress toward functional goals is gradual  Outcome Summary/Follow up Plan: able to tolerate standing assist x 2 today EOB with encouragement. pt states has  not walked in 6 months and they just got a elizabet lift. will cont to encouraged mobility as jennifer, may be near baseline.           Outcome Measures       04/07/17 1500 04/06/17 1500 04/05/17 1500    How much help from another person do you currently need...    Turning from your back to your side while in flat bed without using bedrails? 2  - 2  -CW 2  -CW    Moving from lying on back to sitting on the side of a flat bed without bedrails? 2  - 2  -CW 2  -CW    Moving to and from a bed to a chair (including a wheelchair)? 1  - 1  -CW 1  -CW    Standing up from a chair using your arms (e.g., wheelchair, bedside chair)? 1  - 1  -CW 1  -CW    Climbing 3-5 steps with a railing? 1  - 1  -CW 1  -CW    To walk in hospital room? 1  - 1  -CW 1  -CW    AM-PAC 6 Clicks Score 8  - 8  -CW 8  -CW    Functional Assessment    Outcome Measure Options AM-PAC 6 Clicks Basic Mobility (PT)  - AM-PAC 6 Clicks Basic Mobility (PT)  -CW AM-PAC 6 Clicks Basic Mobility (PT)  -CW      User Key  (r) = Recorded By, (t) = Taken By, (c) = Cosigned By    Initials Name Provider Type     Radha White PT Physical Therapist    CW Roberto Clay Physical Therapy Assistant           Time Calculation:         PT Charges       04/07/17 1558          Time Calculation    Start Time 1535  -      Stop Time 1550  -      Time Calculation (min) 15 min  -      PT Received On 04/07/17  -      PT - Next Appointment 04/10/17  -        User Key  (r) = Recorded By, (t) = Taken By, (c) = Cosigned By    Initials Name Provider Type     Radha White PT Physical Therapist          Therapy Charges for Today     Code Description Service Date Service Provider Modifiers Qty    56860146695 HC PT THER SUPP EA 15 MIN 4/7/2017 Radha White, PT GP 1    30634870244 HC PT THER PROC EA 15 MIN 4/7/2017 Radha White PT GP 1          PT G-Codes  Outcome Measure Options: AM-PAC 6 Clicks Basic Mobility (PT)    Radha White PT  4/7/2017

## 2017-04-07 NOTE — PLAN OF CARE
Problem: Fall Risk (Adult)  Goal: Identify Related Risk Factors and Signs and Symptoms  Outcome: Ongoing (interventions implemented as appropriate)    Problem: Pain, Acute (Adult)  Goal: Identify Related Risk Factors and Signs and Symptoms  Outcome: Ongoing (interventions implemented as appropriate)    Problem: Pneumonia (Adult)  Goal: Signs and Symptoms of Listed Potential Problems Will be Absent or Manageable (Pneumonia)  Outcome: Ongoing (interventions implemented as appropriate)    Problem: Patient Care Overview (Adult)  Goal: Plan of Care Review  Outcome: Ongoing (interventions implemented as appropriate)    04/07/17 0201   Coping/Psychosocial Response Interventions   Plan Of Care Reviewed With patient   Patient Care Overview   Progress improving   Outcome Evaluation   Outcome Summary/Follow up Plan vitals stable, am labs, encourage TCDB, pain control, PO antibx, encourage activity w/ PT         Problem: Respiratory Insufficiency (Adult)  Goal: Identify Related Risk Factors and Signs and Symptoms  Outcome: Ongoing (interventions implemented as appropriate)

## 2017-04-09 NOTE — PLAN OF CARE
Problem: Inpatient Physical Therapy  Goal: Bed Mobility Goal LTG- PT  Outcome: Unable to achieve outcome(s) by discharge Date Met:  04/09/17 04/09/17 0721   Bed Mobility PT LTG   Bed Mobility PT LTG, Outcome goal not met   Bed Mobility PT LTG, Reason Goal Not Met discharged from facility       Goal: Transfer Training Goal 1 LTG- PT  Outcome: Outcome(s) achieved Date Met:  04/09/17 04/09/17 0721   Transfer Training PT LTG   Transfer Training PT LTG, Outcome goal met       Goal: Transfer Training Goal 2 LTG- PT  Outcome: Outcome(s) achieved Date Met:  04/09/17 04/09/17 0721   Transfer Training 2 PT LTG   Transfer Training PT 2 LTG, Outcome goal met

## 2017-04-09 NOTE — THERAPY DISCHARGE NOTE
Acute Care - Physical Therapy Discharge Summary  [facility]       Patient Name: Britney Ellison  : 1945  MRN: [id number]    Today's Date: 2017  Onset of Illness/Injury or Date of Surgery Date: 17              Admit Date: 4/3/2017      PT Recommendation and Plan    Visit Dx:    ICD-10-CM ICD-9-CM   1. Pleural effusion, left J90 511.9   2. Atelectasis J98.11 518.0   3. Generalized weakness R53.1 780.79             Outcome Measures       17 1500 17 1500       How much help from another person do you currently need...    Turning from your back to your side while in flat bed without using bedrails? 2  - 2  -CW     Moving from lying on back to sitting on the side of a flat bed without bedrails? 2  - 2  -CW     Moving to and from a bed to a chair (including a wheelchair)? 1  - 1  -CW     Standing up from a chair using your arms (e.g., wheelchair, bedside chair)? 1  - 1  -CW     Climbing 3-5 steps with a railing? 1  - 1  -CW     To walk in hospital room? 1  - 1  -CW     AM-PAC 6 Clicks Score 8  - 8  -CW     Functional Assessment    Outcome Measure Options AM-PAC 6 Clicks Basic Mobility (PT)  - AM-PAC 6 Clicks Basic Mobility (PT)  -CW       User Key  (r) = Recorded By, (t) = Taken By, (c) = Cosigned By    Initials Name Provider Type     Radha White, PT Physical Therapist    CW Roberto Clay Physical Therapy Assistant                      IP PT Goals       17 0721 17 1447       Bed Mobility PT LTG    Bed Mobility PT LTG, Time to Achieve  1 wk  -CH     Bed Mobility PT LTG, Activity Type  all bed mobility  -CH     Bed Mobility PT LTG, [place] Level  minimum assist (75% patient effort)  -CH     Bed Mobility PT LTG, Outcome goal not met  -JAIME      Bed Mobility PT LTG, Reason Goal Not Met discharged from facility  -JAIME      Transfer Training PT LTG    Transfer Training PT LTG, Time to Achieve  1 wk  -CH     Transfer Training PT LTG, Activity Type  sit to  stand/stand to sit  -CH     Transfer Training PT LTG, [place] Level  moderate assist (50% patient effort);2 person assist required  -CH     Transfer Training PT LTG, Assist Device  walker, rolling  -CH     Transfer Training PT LTG, Outcome goal met  -JAIME      Transfer Training 2 PT LTG    Transfer Training PT 2 LTG, Time to Achieve  1 wk  -CH     Transfer Training PT 2 LTG, Activity Type  bed to chair /chair to bed  -CH     Transfer Training PT 2 LTG, [place] Level  moderate assist (50% patient effort);2 person assist required  -CH     Transfer Training PT 2 LTG, Assist Device  walker, rolling  -CH     Transfer Training PT 2 LTG, Outcome goal met  -JAIME        User Key  (r) = Recorded By, (t) = Taken By, (c) = Cosigned By    Initials Name Provider Type    JAIME Everett, PT Physical Therapist    FRANCISCO Doe, PT Physical Therapist              PT Discharge Summary  Reason for Discharge: Discharge from facility      Merced Everett, PT   4/9/2017

## 2017-11-22 PROBLEM — R16.1 SPLENOMEGALY: Status: ACTIVE | Noted: 2017-01-01

## 2017-11-22 PROBLEM — J98.11 ATELECTASIS: Status: ACTIVE | Noted: 2017-01-01

## 2017-11-22 PROBLEM — L03.311 ABDOMINAL WALL CELLULITIS: Status: ACTIVE | Noted: 2017-01-01

## 2017-11-22 PROBLEM — N17.9 ACUTE KIDNEY INJURY (HCC): Status: ACTIVE | Noted: 2017-01-01

## 2017-11-22 PROBLEM — D64.9 ANEMIA: Status: ACTIVE | Noted: 2017-01-01

## 2017-11-22 PROBLEM — K74.60 CIRRHOSIS OF LIVER WITHOUT ASCITES (HCC): Status: ACTIVE | Noted: 2017-01-01

## 2017-11-23 PROBLEM — J18.9 PNA (PNEUMONIA): Status: ACTIVE | Noted: 2017-01-01

## 2017-11-23 PROBLEM — M79.3 PANNICULITIS: Status: ACTIVE | Noted: 2017-01-01

## 2017-11-23 PROBLEM — E66.9 SUPER OBESE: Status: ACTIVE | Noted: 2017-01-01

## 2017-11-24 PROBLEM — N17.9 ACUTE RENAL FAILURE (HCC): Status: ACTIVE | Noted: 2017-01-01

## 2017-11-24 PROBLEM — A41.9 SEPSIS (HCC): Status: ACTIVE | Noted: 2017-01-01

## 2017-11-26 PROBLEM — E87.20 METABOLIC ACIDOSIS: Status: ACTIVE | Noted: 2017-01-01

## 2017-11-27 PROBLEM — L03.311 ABDOMINAL WALL CELLULITIS: Status: ACTIVE | Noted: 2017-01-01

## 2017-11-28 PROBLEM — Z51.5 PALLIATIVE CARE BY SPECIALIST: Status: ACTIVE | Noted: 2017-01-01

## (undated) DEVICE — MSK AIRWY LARYNG LMA UNIQUE STD PK SZ4

## (undated) DEVICE — LN SMPL O2 NASL/ORL SMART/CAPNOLINE PLS A/

## (undated) DEVICE — VITAL SIGNS™ JACKSON-REES CIRCUITS: Brand: VITAL SIGNS™

## (undated) DEVICE — SINGLE USE SUCTION VALVE MAJ-209: Brand: SINGLE USE SUCTION VALVE (STERILE)

## (undated) DEVICE — TUBING, SUCTION, 1/4" X 10', STRAIGHT: Brand: MEDLINE

## (undated) DEVICE — BITEBLOCK OMNI BLOC

## (undated) DEVICE — SINGLE USE BIOPSY VALVE MAJ-210: Brand: SINGLE USE BIOPSY VALVE (STERILE)

## (undated) DEVICE — ADAPT SWVL FIBROPTIC BRONCH